# Patient Record
Sex: FEMALE | Race: WHITE | NOT HISPANIC OR LATINO | Employment: FULL TIME | URBAN - METROPOLITAN AREA
[De-identification: names, ages, dates, MRNs, and addresses within clinical notes are randomized per-mention and may not be internally consistent; named-entity substitution may affect disease eponyms.]

---

## 2017-01-09 RX ORDER — LEVOTHYROXINE SODIUM 0.05 MG/1
TABLET ORAL
Qty: 30 TAB | Refills: 0 | Status: SHIPPED | OUTPATIENT
Start: 2017-01-09 | End: 2017-01-16 | Stop reason: SDUPTHER

## 2017-01-09 NOTE — TELEPHONE ENCOUNTER
1 month refilled. Please advise pt to do labs (ordered October 2016)   Rukhsana Francis MD  [facility] [address] [place] [address]

## 2017-01-11 ENCOUNTER — HOSPITAL ENCOUNTER (OUTPATIENT)
Dept: LAB | Facility: MEDICAL CENTER | Age: 62
End: 2017-01-11
Attending: FAMILY MEDICINE
Payer: COMMERCIAL

## 2017-01-11 DIAGNOSIS — E03.9 HYPOTHYROIDISM, UNSPECIFIED TYPE: ICD-10-CM

## 2017-01-11 LAB
T4 FREE SERPL-MCNC: 1.03 NG/DL (ref 0.53–1.43)
TSH SERPL DL<=0.005 MIU/L-ACNC: 3.97 UIU/ML (ref 0.3–3.7)

## 2017-01-11 PROCEDURE — 84443 ASSAY THYROID STIM HORMONE: CPT

## 2017-01-11 PROCEDURE — 84439 ASSAY OF FREE THYROXINE: CPT

## 2017-01-11 PROCEDURE — 36415 COLL VENOUS BLD VENIPUNCTURE: CPT

## 2017-01-16 ENCOUNTER — PATIENT MESSAGE (OUTPATIENT)
Dept: MEDICAL GROUP | Age: 62
End: 2017-01-16

## 2017-01-16 DIAGNOSIS — E03.4 HYPOTHYROIDISM DUE TO ACQUIRED ATROPHY OF THYROID: ICD-10-CM

## 2017-01-16 RX ORDER — LEVOTHYROXINE SODIUM 0.05 MG/1
50 TABLET ORAL EVERY MORNING
Qty: 90 TAB | Refills: 2 | Status: SHIPPED | OUTPATIENT
Start: 2017-01-16 | End: 2017-12-06 | Stop reason: SDUPTHER

## 2017-03-18 DIAGNOSIS — M81.0 OSTEOPOROSIS: ICD-10-CM

## 2017-03-20 RX ORDER — ALENDRONATE SODIUM 70 MG/1
TABLET ORAL
Qty: 12 TAB | Refills: 3 | Status: SHIPPED | OUTPATIENT
Start: 2017-03-20 | End: 2018-02-16 | Stop reason: SDUPTHER

## 2017-03-20 RX ORDER — ATORVASTATIN CALCIUM 40 MG/1
TABLET, FILM COATED ORAL
Qty: 90 TAB | Refills: 1 | Status: SHIPPED | OUTPATIENT
Start: 2017-03-20 | End: 2017-08-07 | Stop reason: SDUPTHER

## 2017-05-11 ENCOUNTER — OFFICE VISIT (OUTPATIENT)
Dept: MEDICAL GROUP | Facility: PHYSICIAN GROUP | Age: 62
End: 2017-05-11
Payer: COMMERCIAL

## 2017-05-11 VITALS
WEIGHT: 174 LBS | RESPIRATION RATE: 16 BRPM | HEIGHT: 70 IN | OXYGEN SATURATION: 95 % | SYSTOLIC BLOOD PRESSURE: 108 MMHG | TEMPERATURE: 97.9 F | HEART RATE: 71 BPM | DIASTOLIC BLOOD PRESSURE: 62 MMHG | BODY MASS INDEX: 24.91 KG/M2

## 2017-05-11 DIAGNOSIS — M81.0 OSTEOPOROSIS, UNSPECIFIED OSTEOPOROSIS TYPE, UNSPECIFIED PATHOLOGICAL FRACTURE PRESENCE: ICD-10-CM

## 2017-05-11 DIAGNOSIS — Z23 NEED FOR ZOSTER VACCINATION: ICD-10-CM

## 2017-05-11 DIAGNOSIS — E03.4 HYPOTHYROIDISM DUE TO ACQUIRED ATROPHY OF THYROID: ICD-10-CM

## 2017-05-11 DIAGNOSIS — Z23 NEED FOR TDAP VACCINATION: ICD-10-CM

## 2017-05-11 DIAGNOSIS — E78.2 MIXED HYPERLIPIDEMIA: ICD-10-CM

## 2017-05-11 PROCEDURE — 90471 IMMUNIZATION ADMIN: CPT | Performed by: FAMILY MEDICINE

## 2017-05-11 PROCEDURE — 99214 OFFICE O/P EST MOD 30 MIN: CPT | Mod: 25 | Performed by: FAMILY MEDICINE

## 2017-05-11 PROCEDURE — 90715 TDAP VACCINE 7 YRS/> IM: CPT | Performed by: FAMILY MEDICINE

## 2017-05-11 ASSESSMENT — ENCOUNTER SYMPTOMS
HEADACHES: 0
CHILLS: 0
COUGH: 0
MUSCULOSKELETAL NEGATIVE: 1
PSYCHIATRIC NEGATIVE: 1
EYES NEGATIVE: 1
DIZZINESS: 0
NEUROLOGICAL NEGATIVE: 1
HEMOPTYSIS: 0
PALPITATIONS: 0
FEVER: 0
CONSTITUTIONAL NEGATIVE: 1
GASTROINTESTINAL NEGATIVE: 1
MYALGIAS: 0
NECK PAIN: 0
CONSTIPATION: 0
RESPIRATORY NEGATIVE: 1
CARDIOVASCULAR NEGATIVE: 1

## 2017-05-11 NOTE — MR AVS SNAPSHOT
"        Shellie Dobbins   2017 4:00 PM   Office Visit   MRN: [id number]    Department:  [facility]    Dept Phone:  [phone]    Description:  Female : 1955   Provider:  Faisal Patel M.D.           Reason for Visit     Establish Care           Allergies as of 2017     Allergen Noted Reactions    Nkda [No Known Drug Allergy] 2011         You were diagnosed with     Mixed hyperlipidemia   [272.2.ICD-9-CM]       Osteoporosis, unspecified osteoporosis type, unspecified pathological fracture presence   [2236900]       Hypothyroidism due to acquired atrophy of thyroid   [6827229]       Need for zoster vaccination   [827463]       Need for Tdap vaccination   [578938]         Vital Signs     Blood Pressure Pulse Temperature Respirations Height Weight    108/62 mmHg 71 36.6 °C (97.9 °F) 16 1.778 m (5' 10\") 78.926 kg (174 lb)    Body Mass Index Oxygen Saturation Last Menstrual Period Smoking Status          24.97 kg/m2 95% 11/10/2006 Never Smoker         Basic Information     Date Of Birth Sex Race Ethnicity Preferred Language    1955 Female White Non- English      Problem List              ICD-10-CM Priority Class Noted - Resolved    Familial hyperlipidemia E78.4   2010 - Present    Vitamin D insufficiency E55.9   2010 - Present    Atopic dermatitis L20.9   2010 - Present    Preventative health care Z00.00   2011 - Present    Osteoporosis M81.0   2011 - Present    Soft tissue mass M79.9   2013 - Present    Menopause Z78.0   2013 - Present    CLAIR (obstructive sleep apnea) G47.33   2013 - Present    H/O tear of meniscus of knee joint Z87.828   3/12/2014 - Present    DDD (degenerative disc disease), cervical M50.30   3/12/2014 - Present    Posterior neck pain M54.2   2016 - Present    Lipoma of neck D17.0   2016 - Present      Health Maintenance        Date Due Completion Dates    IMM DTaP/Tdap/Td Vaccine (2 - Td) 2016 " 7/21/2006    IMM ZOSTER VACCINE 11/10/2016 ---    MAMMOGRAM 12/7/2017 12/7/2016, 10/8/2015, 3/25/2014, 2/8/2013 (Done), 1/18/2012, 1/3/2011, 8/10/2009, 8/10/2009, 8/4/2008, 8/4/2008    Override on 2/8/2013: Done (Carrie Diatnostic)    COLONOSCOPY 12/21/2017 12/21/2007    BONE DENSITY 4/11/2018 4/11/2016, 6/13/2011, 8/4/2008    PAP SMEAR 9/14/2018 9/14/2015, 9/4/2013, 6/7/2011            Current Immunizations     Tdap Vaccine  Incomplete, 7/21/2006      Below and/or attached are the medications your provider expects you to take. Review all of your home medications and newly ordered medications with your provider and/or pharmacist. Follow medication instructions as directed by your provider and/or pharmacist. Please keep your medication list with you and share with your provider. Update the information when medications are discontinued, doses are changed, or new medications (including over-the-counter products) are added; and carry medication information at all times in the event of emergency situations     Allergies:  NKDA - (reactions not documented)               Medications  Valid as of: May 11, 2017 -  4:30 PM    Generic Name Brand Name Tablet Size Instructions for use    Alendronate Sodium (Tab) FOSAMAX 70 MG TAKE 1 TABLET BY MOUTH EVERY 7 DAYS ON AN EMPTY STOMACH WITH A GLASS OF WATER. SIT UPRIGHT AND DO NOT CONSUME FOOD OR MEDS FOR 30 MINUTES        Atorvastatin Calcium (Tab) LIPITOR 40 MG TAKE 1 TABLET BY MOUTH EVERY NIGHT AT BEDTIME        Calcium Carbonate   Take  by mouth every day.        Cholecalciferol (Cap) Vitamin D3 2000 UNIT Take  by mouth every day.        Ibuprofen (Tab) MOTRIN 200 MG Take 600 mg by mouth every 8 hours as needed. Indications: Mild to Moderate Pain        Levothyroxine Sodium (Tab) SYNTHROID 50 MCG Take 1 Tab by mouth every morning. ON EMPTY STOMACH        Multiple Vitamins-Minerals   Take  by mouth every day.        Potassium Gluconate (Cap) Potassium Gluconate 595 MG Take  by mouth  every day.        Triamcinolone Acetonide (Ointment) KENALOG 0.1 % Thin layer to clean dry skin twice daily as needed for rash.        Zoster Vaccine Live (Recon Soln) ZOSTAVAX 84195 UNT/0.65ML Inject 0.65 mL as instructed Once for 1 dose.        .                 Medicines prescribed today were sent to:     [facility] [id number] [place] [address], [place] - [address] [facility]    [address] [facility] [place] [address]    Phone: [phone] Fax: [phone]    Open 24 Hours?: No      Medication refill instructions:       If your prescription bottle indicates you have medication refills left, it is not necessary to call your provider’s office. Please contact your pharmacy and they will refill your medication.    If your prescription bottle indicates you do not have any refills left, you may request refills at any time through one of the following ways: The online ixigo system (except Urgent Care), by calling your provider’s office, or by asking your pharmacy to contact your provider’s office with a refill request. Medication refills are processed only during regular business hours and may not be available until the next business day. Your provider may request additional information or to have a follow-up visit with you prior to refilling your medication.   *Please Note: Medication refills are assigned a new Rx number when refilled electronically. Your pharmacy may indicate that no refills were authorized even though a new prescription for the same medication is available at the pharmacy. Please request the medicine by name with the pharmacy before contacting your provider for a refill.           ixigo Access Code: Activation code not generated  Current ixigo Status: Active

## 2017-08-07 DIAGNOSIS — E78.49 FAMILIAL HYPERLIPIDEMIA: ICD-10-CM

## 2017-08-07 RX ORDER — ATORVASTATIN CALCIUM 40 MG/1
TABLET, FILM COATED ORAL
Qty: 90 TAB | Refills: 1 | Status: SHIPPED | OUTPATIENT
Start: 2017-08-07 | End: 2018-01-31 | Stop reason: SDUPTHER

## 2017-11-27 ENCOUNTER — HOSPITAL ENCOUNTER (OUTPATIENT)
Dept: LAB | Facility: MEDICAL CENTER | Age: 62
End: 2017-11-27
Attending: FAMILY MEDICINE
Payer: COMMERCIAL

## 2017-11-27 DIAGNOSIS — E78.49 FAMILIAL HYPERLIPIDEMIA: ICD-10-CM

## 2017-11-27 LAB
ALBUMIN SERPL BCP-MCNC: 3.7 G/DL (ref 3.2–4.9)
ALBUMIN/GLOB SERPL: 1 G/DL
ALP SERPL-CCNC: 54 U/L (ref 30–99)
ALT SERPL-CCNC: 24 U/L (ref 2–50)
ANION GAP SERPL CALC-SCNC: 6 MMOL/L (ref 0–11.9)
AST SERPL-CCNC: 20 U/L (ref 12–45)
BILIRUB SERPL-MCNC: 0.5 MG/DL (ref 0.1–1.5)
BUN SERPL-MCNC: 20 MG/DL (ref 8–22)
CALCIUM SERPL-MCNC: 9.4 MG/DL (ref 8.5–10.5)
CHLORIDE SERPL-SCNC: 106 MMOL/L (ref 96–112)
CHOLEST SERPL-MCNC: 218 MG/DL (ref 100–199)
CO2 SERPL-SCNC: 28 MMOL/L (ref 20–33)
CREAT SERPL-MCNC: 0.69 MG/DL (ref 0.5–1.4)
ERYTHROCYTE [DISTWIDTH] IN BLOOD BY AUTOMATED COUNT: 47.7 FL (ref 35.9–50)
GFR SERPL CREATININE-BSD FRML MDRD: >60 ML/MIN/1.73 M 2
GLOBULIN SER CALC-MCNC: 3.6 G/DL (ref 1.9–3.5)
GLUCOSE SERPL-MCNC: 117 MG/DL (ref 65–99)
HCT VFR BLD AUTO: 44 % (ref 37–47)
HDLC SERPL-MCNC: 75 MG/DL
HGB BLD-MCNC: 14.5 G/DL (ref 12–16)
LDLC SERPL CALC-MCNC: 133 MG/DL
MCH RBC QN AUTO: 30.7 PG (ref 27–33)
MCHC RBC AUTO-ENTMCNC: 33 G/DL (ref 33.6–35)
MCV RBC AUTO: 93.2 FL (ref 81.4–97.8)
PLATELET # BLD AUTO: 203 K/UL (ref 164–446)
PMV BLD AUTO: 11.8 FL (ref 9–12.9)
POTASSIUM SERPL-SCNC: 4.1 MMOL/L (ref 3.6–5.5)
PROT SERPL-MCNC: 7.3 G/DL (ref 6–8.2)
RBC # BLD AUTO: 4.72 M/UL (ref 4.2–5.4)
SODIUM SERPL-SCNC: 140 MMOL/L (ref 135–145)
TRIGL SERPL-MCNC: 50 MG/DL (ref 0–149)
WBC # BLD AUTO: 4.8 K/UL (ref 4.8–10.8)

## 2017-11-27 PROCEDURE — 84480 ASSAY TRIIODOTHYRONINE (T3): CPT

## 2017-11-27 PROCEDURE — 36415 COLL VENOUS BLD VENIPUNCTURE: CPT

## 2017-11-27 PROCEDURE — 80061 LIPID PANEL: CPT

## 2017-11-27 PROCEDURE — 84439 ASSAY OF FREE THYROXINE: CPT

## 2017-11-27 PROCEDURE — 82306 VITAMIN D 25 HYDROXY: CPT

## 2017-11-27 PROCEDURE — 85027 COMPLETE CBC AUTOMATED: CPT

## 2017-11-27 PROCEDURE — 84443 ASSAY THYROID STIM HORMONE: CPT

## 2017-11-27 PROCEDURE — 80053 COMPREHEN METABOLIC PANEL: CPT

## 2017-11-28 LAB
25(OH)D3 SERPL-MCNC: 40 NG/ML (ref 30–100)
T3 SERPL-MCNC: 104 NG/DL (ref 60–181)
T4 FREE SERPL-MCNC: 0.94 NG/DL (ref 0.53–1.43)
TSH SERPL DL<=0.005 MIU/L-ACNC: 3.72 UIU/ML (ref 0.3–3.7)

## 2017-11-30 ENCOUNTER — OFFICE VISIT (OUTPATIENT)
Dept: MEDICAL GROUP | Facility: PHYSICIAN GROUP | Age: 62
End: 2017-11-30
Payer: COMMERCIAL

## 2017-11-30 VITALS
SYSTOLIC BLOOD PRESSURE: 102 MMHG | TEMPERATURE: 96.7 F | OXYGEN SATURATION: 95 % | RESPIRATION RATE: 14 BRPM | HEIGHT: 70 IN | BODY MASS INDEX: 25.34 KG/M2 | DIASTOLIC BLOOD PRESSURE: 68 MMHG | HEART RATE: 97 BPM | WEIGHT: 177 LBS

## 2017-11-30 DIAGNOSIS — Z00.00 WELL ADULT EXAM: ICD-10-CM

## 2017-11-30 DIAGNOSIS — E03.4 HYPOTHYROIDISM DUE TO ACQUIRED ATROPHY OF THYROID: ICD-10-CM

## 2017-11-30 DIAGNOSIS — R73.02 GLUCOSE INTOLERANCE (IMPAIRED GLUCOSE TOLERANCE): ICD-10-CM

## 2017-11-30 DIAGNOSIS — E78.49 FAMILIAL HYPERLIPIDEMIA: ICD-10-CM

## 2017-11-30 PROCEDURE — 99214 OFFICE O/P EST MOD 30 MIN: CPT | Performed by: FAMILY MEDICINE

## 2017-11-30 ASSESSMENT — ENCOUNTER SYMPTOMS
CARDIOVASCULAR NEGATIVE: 1
GASTROINTESTINAL NEGATIVE: 1
FEVER: 0
MYALGIAS: 0
PSYCHIATRIC NEGATIVE: 1
CHILLS: 0
COUGH: 0
EYES NEGATIVE: 1
NECK PAIN: 0
PALPITATIONS: 0
DIZZINESS: 0
RESPIRATORY NEGATIVE: 1
HEADACHES: 0
CONSTIPATION: 0
MUSCULOSKELETAL NEGATIVE: 1
NEUROLOGICAL NEGATIVE: 1
HEMOPTYSIS: 0
CONSTITUTIONAL NEGATIVE: 1

## 2017-11-30 ASSESSMENT — PATIENT HEALTH QUESTIONNAIRE - PHQ9: CLINICAL INTERPRETATION OF PHQ2 SCORE: 0

## 2017-11-30 NOTE — PROGRESS NOTES
Subjective:      Shellie Dobbins is a 62 y.o. female who presents with Blood Pressure Problem            1. Well adult exam    - REFERRAL TO GASTROENTEROLOGY    2. Familial hyperlipidemia  Currently treated for HLD, taking meds with no new myalgias or joint pain, watching fats in diet  controlled      3. Hypothyroidism due to acquired atrophy of thyroid  Patient is being treated for hypothyroidism, currently taking meds, no symptoms of fast or slow heartbeat and energy level steady. No new hair loss or skin symptoms. Labs have been checked in past year and are stable on current dose.  controlled      4. Glucose intolerance (impaired glucose tolerance)  fbs of 117, advised on low carb diet will have her return in 6 mo and do a1c    Past Medical History:  2/8/2010: Atopic dermatitis  9/1/2011: Baker's cyst of knee  1999: Breast augmentation      Comment: Saline  3/12/2014: DDD (degenerative disc disease), cervical  2/8/2010: Familial hyperlipidemia  3/12/2014: H/O tear of meniscus of knee joint      Comment: Right knee. Had surgery 2011. Now left knee                pain. Felt great last year, working out.                Overdid it working out. Increased pain to point               very swollen, hard to bend and straighten left                leg starting last week. Has felt like about to                give way when walking today. Has MRI scheduled.               Feels just like the right knee felt before                surgery. No brace. Not helping due to swelling.               Taking ibuprofen. 600 mg bid.   No date: High cholesterol  9/1/2011: Medial meniscus tear  9/14/2013: Menopause      Comment: 2007  19yo: Mononucleosis      Comment: Mono induced Hepatitis  9/14/2013: CLAIR (obstructive sleep apnea)      Comment: AHI 17  In 2009. Uses nightgard  6/18/2011: Osteopenia  1/4/2013: Soft tissue mass  2/8/2010: Vitamin d deficiency  Past Surgical History:  9/21/2016: MASS EXCISION ORTHO Bilateral      Comment:  Procedure: MASS EXCISION ORTHO - EXCISION                POSTERIOR NECK LIPOMA;  Surgeon: Troy Kapoor M.D.;  Location: SURGERY [facility]                ORS;  Service:   may 2014: MENISCECTOMY Left      Comment: Karlos - left knee  10/6/2011: KNEE ARTHROSCOPY      Comment: Performed by ALEXANDRIA BRAR at SURGERY                [facility]  10/6/2011: MEDIAL MENISCECTOMY      Comment: Performed by ALEXANDRIA BRAR at SURGERY                [facility]  2007: COLONOSCOPY  1999: MAMMOPLASTY AUGMENTATION      Comment: saline  1997: TUBAL COAGULATION LAPAROSCOPIC BILATERAL  1984: PRIMARY C SECTION  Smoking status: Never Smoker                                                              Smokeless tobacco: Never Used                      Alcohol use: Yes           0.5 oz/week     Glasses of wine: 1 per week     Comment: 1-2 per week    Review of patient's family history indicates:    Stroke                         Mother                      Comment: d 86    Heart Disease                  Father                      Comment: d. 59, rheum heart dz    Hyperlipidemia                 Father                    Cancer                         Sister                      Comment: Melanoma    Diabetes                       Neg Hx                    Heart Disease                  Sister                      Comment: aneurysm on heart      Current Outpatient Prescriptions: •  atorvastatin (LIPITOR) 40 MG Tab, TAKE 1 TABLET BY MOUTH EVERY NIGHT AT BEDTIME, Disp: 90 Tab, Rfl: 1•  alendronate (FOSAMAX) 70 MG Tab, TAKE 1 TABLET BY MOUTH EVERY 7 DAYS ON AN EMPTY STOMACH WITH A GLASS OF WATER. SIT UPRIGHT AND DO NOT CONSUME FOOD OR MEDS FOR 30 MINUTES, Disp: 12 Tab, Rfl: 3•  levothyroxine (SYNTHROID) 50 MCG Tab, Take 1 Tab by mouth every morning. ON EMPTY STOMACH, Disp: 90 Tab, Rfl: 2•  Multiple Vitamins-Minerals (MULTIVITAMIN PO), Take  by mouth every day., Disp: , Rfl: •  Potassium  Gluconate 595 MG Cap, Take  by mouth every day., Disp: , Rfl: •  Calcium Carbonate (CALCIUM 600 PO), Take  by mouth every day., Disp: , Rfl: •  ibuprofen (MOTRIN) 200 MG TABS, Take 600 mg by mouth every 8 hours as needed. Indications: Mild to Moderate Pain, Disp: , Rfl: •  Cholecalciferol (VITAMIN D3) 2000 UNIT Cap, Take  by mouth every day., Disp: , Rfl: •  triamcinolone acetonide (KENALOG) 0.1 % Ointment, Thin layer to clean dry skin twice daily as needed for rash., Disp: 60 g, Rfl: 4    Patient was instructed on the use of medications, either prescriptions or OTC and informed on when the appropriate follow up time period should be. In addition, patient was also instructed that should any acute worsening occur that they should notify this clinic asap or call 911.            Review of Systems   Constitutional: Negative.  Negative for chills and fever.        Past Medical History:  2/8/2010: Atopic dermatitis  9/1/2011: Baker's cyst of knee  1999: Breast augmentation      Comment: Saline  3/12/2014: DDD (degenerative disc disease), cervical  2/8/2010: Familial hyperlipidemia  3/12/2014: H/O tear of meniscus of knee joint      Comment: Right knee. Had surgery 2011. Now left knee                pain. Felt great last year, working out.                Overdid it working out. Increased pain to point               very swollen, hard to bend and straighten left                leg starting last week. Has felt like about to                give way when walking today. Has MRI scheduled.               Feels just like the right knee felt before                surgery. No brace. Not helping due to swelling.               Taking ibuprofen. 600 mg bid.   No date: High cholesterol  9/1/2011: Medial meniscus tear  9/14/2013: Menopause      Comment: 2007  17yo: Mononucleosis      Comment: Mono induced Hepatitis  9/14/2013: CLAIR (obstructive sleep apnea)      Comment: AHI 17  In 2009. Uses nightgard  6/18/2011: Osteopenia  1/4/2013:  Soft tissue mass  2/8/2010: Vitamin d deficiency  Past Surgical History:  9/21/2016: MASS EXCISION ORTHO Bilateral      Comment: Procedure: MASS EXCISION ORTHO - EXCISION                POSTERIOR NECK LIPOMA;  Surgeon: Troy Kapoor M.D.;  Location: SURGERY [facility];  Service:   may 2014: MENISCECTOMY Left      Comment: Karlos - left knee  10/6/2011: KNEE ARTHROSCOPY      Comment: Performed by ALEXANDRIA BRAR at SURGERY                [facility]  10/6/2011: MEDIAL MENISCECTOMY      Comment: Performed by ALEXANDRIA BRAR at SURGERY                [facility]  2007: COLONOSCOPY  1999: MAMMOPLASTY AUGMENTATION      Comment: saline  1997: TUBAL COAGULATION LAPAROSCOPIC BILATERAL  1984: PRIMARY C SECTION  Smoking status: Never Smoker                                                              Smokeless tobacco: Never Used                      Alcohol use: Yes           0.5 oz/week     Glasses of wine: 1 per week     Comment: 1-2 per week    Review of patient's family history indicates:    Stroke                         Mother                      Comment: d 86    Heart Disease                  Father                      Comment: d. 59, rheum heart dz    Hyperlipidemia                 Father                    Cancer                         Sister                      Comment: Melanoma    Diabetes                       Neg Hx                    Heart Disease                  Sister                      Comment: aneurysm on heart     HENT: Negative.    Eyes: Negative.    Respiratory: Negative.  Negative for cough and hemoptysis.    Cardiovascular: Negative.  Negative for chest pain and palpitations.   Gastrointestinal: Negative.  Negative for constipation.   Genitourinary: Negative.  Negative for dysuria and urgency.   Musculoskeletal: Negative.  Negative for myalgias and neck pain.   Skin: Negative.  Negative for rash.   Neurological: Negative.   "Negative for dizziness and headaches.   Endo/Heme/Allergies: Negative.    Psychiatric/Behavioral: Negative.  Negative for suicidal ideas.          Objective:     /68   Pulse 97   Temp 35.9 °C (96.7 °F)   Resp 14   Ht 1.778 m (5' 10\")   Wt 80.3 kg (177 lb)   LMP 05/01/2008   SpO2 95%   BMI 25.40 kg/m²      Physical Exam   Constitutional: She is oriented to person, place, and time. She appears well-developed and well-nourished. No distress.   HENT:   Head: Normocephalic and atraumatic.   Mouth/Throat: Oropharynx is clear and moist. No oropharyngeal exudate.   Eyes: Pupils are equal, round, and reactive to light.   Cardiovascular: Normal rate, regular rhythm, normal heart sounds and intact distal pulses.  Exam reveals no gallop and no friction rub.    No murmur heard.  Pulmonary/Chest: Effort normal and breath sounds normal. No respiratory distress. She has no wheezes. She has no rales. She exhibits no tenderness.   Neurological: She is alert and oriented to person, place, and time.   Skin: She is not diaphoretic.   Psychiatric: She has a normal mood and affect. Her behavior is normal. Judgment and thought content normal.   Nursing note and vitals reviewed.              Assessment/Plan:     1. Well adult exam    - REFERRAL TO GASTROENTEROLOGY    2. Familial hyperlipidemia      3. Hypothyroidism due to acquired atrophy of thyroid      4. Glucose intolerance (impaired glucose tolerance)        "

## 2017-12-07 RX ORDER — LEVOTHYROXINE SODIUM 0.05 MG/1
TABLET ORAL
Qty: 90 TAB | Refills: 0 | Status: SHIPPED | OUTPATIENT
Start: 2017-12-07 | End: 2018-03-04 | Stop reason: SDUPTHER

## 2017-12-13 ENCOUNTER — HOSPITAL ENCOUNTER (OUTPATIENT)
Dept: RADIOLOGY | Facility: MEDICAL CENTER | Age: 62
End: 2017-12-13
Attending: FAMILY MEDICINE
Payer: COMMERCIAL

## 2017-12-13 DIAGNOSIS — Z12.31 VISIT FOR SCREENING MAMMOGRAM: ICD-10-CM

## 2017-12-13 PROCEDURE — G0202 SCR MAMMO BI INCL CAD: HCPCS

## 2018-01-31 DIAGNOSIS — E78.49 FAMILIAL HYPERLIPIDEMIA: ICD-10-CM

## 2018-01-31 RX ORDER — ATORVASTATIN CALCIUM 40 MG/1
TABLET, FILM COATED ORAL
Qty: 90 TAB | Refills: 1 | Status: SHIPPED | OUTPATIENT
Start: 2018-01-31 | End: 2018-06-04 | Stop reason: SDUPTHER

## 2018-02-16 NOTE — TELEPHONE ENCOUNTER
Was the patient seen in the last year in this department? Yes     Does patient have an active prescription for medications requested? Yes     Received Request Via: Pharmacy     Last visit 11/30/2017  Last Labs 11/27/2017

## 2018-02-17 RX ORDER — ALENDRONATE SODIUM 70 MG/1
TABLET ORAL
Qty: 12 TAB | Refills: 0 | Status: SHIPPED | OUTPATIENT
Start: 2018-02-17 | End: 2018-05-20 | Stop reason: SDUPTHER

## 2018-02-20 ENCOUNTER — OFFICE VISIT (OUTPATIENT)
Dept: MEDICAL GROUP | Facility: PHYSICIAN GROUP | Age: 63
End: 2018-02-20
Payer: COMMERCIAL

## 2018-02-20 VITALS
RESPIRATION RATE: 16 BRPM | BODY MASS INDEX: 25.48 KG/M2 | SYSTOLIC BLOOD PRESSURE: 102 MMHG | TEMPERATURE: 97.9 F | HEART RATE: 64 BPM | OXYGEN SATURATION: 96 % | HEIGHT: 70 IN | WEIGHT: 178 LBS | DIASTOLIC BLOOD PRESSURE: 74 MMHG

## 2018-02-20 DIAGNOSIS — L73.9 FOLLICULITIS: ICD-10-CM

## 2018-02-20 PROCEDURE — 99214 OFFICE O/P EST MOD 30 MIN: CPT | Performed by: FAMILY MEDICINE

## 2018-02-20 RX ORDER — SULFAMETHOXAZOLE AND TRIMETHOPRIM 800; 160 MG/1; MG/1
1 TABLET ORAL 2 TIMES DAILY
Qty: 20 TAB | Refills: 0 | Status: SHIPPED | OUTPATIENT
Start: 2018-02-20 | End: 2022-02-01

## 2018-02-20 ASSESSMENT — ENCOUNTER SYMPTOMS
CHILLS: 0
MYALGIAS: 0
HEMOPTYSIS: 0
ANOREXIA: 0
NEUROLOGICAL NEGATIVE: 1
PALPITATIONS: 0
MUSCULOSKELETAL NEGATIVE: 1
RESPIRATORY NEGATIVE: 1
CARDIOVASCULAR NEGATIVE: 1
DIZZINESS: 0
PSYCHIATRIC NEGATIVE: 1
HEADACHES: 0
NECK PAIN: 0
CONSTIPATION: 0
EYES NEGATIVE: 1
GASTROINTESTINAL NEGATIVE: 1
COUGH: 0
FEVER: 0
CONSTITUTIONAL NEGATIVE: 1

## 2018-02-20 NOTE — PROGRESS NOTES
Subjective:      Shellie Dobbins is a 62 y.o. female who presents with Cyst (Right side in vagina x 1 week)            Developed a bump in the groin area, squeezed it and it gave a discharge and bump still present    There are no diagnoses linked to this encounter.  Past Medical History:  2/8/2010: Atopic dermatitis  9/1/2011: Baker's cyst of knee  1999: Breast augmentation      Comment: Saline  3/12/2014: DDD (degenerative disc disease), cervical  2/8/2010: Familial hyperlipidemia  3/12/2014: H/O tear of meniscus of knee joint      Comment: Right knee. Had surgery 2011. Now left knee                pain. Felt great last year, working out.                Overdid it working out. Increased pain to point               very swollen, hard to bend and straighten left                leg starting last week. Has felt like about to                give way when walking today. Has MRI scheduled.               Feels just like the right knee felt before                surgery. No brace. Not helping due to swelling.               Taking ibuprofen. 600 mg bid.   No date: High cholesterol  9/1/2011: Medial meniscus tear  9/14/2013: Menopause      Comment: 2007  17yo: Mononucleosis      Comment: Mono induced Hepatitis  9/14/2013: CLAIR (obstructive sleep apnea)      Comment: AHI 17  In 2009. Uses nightgard  6/18/2011: Osteopenia  1/4/2013: Soft tissue mass  2/8/2010: Vitamin d deficiency  Past Surgical History:  9/21/2016: MASS EXCISION ORTHO Bilateral      Comment: Procedure: MASS EXCISION ORTHO - EXCISION                POSTERIOR NECK LIPOMA;  Surgeon: Troy Kapoor M.D.;  Location: SURGERY [facility];  Service:   may 2014: MENISCECTOMY Left      Comment: Karlos - left knee  10/6/2011: KNEE ARTHROSCOPY      Comment: Performed by ALEXANDRIA BRAR at SURGERY                [facility]  10/6/2011: MEDIAL MENISCECTOMY      Comment: Performed by ALEXANDRIA BRAR at SURGERY             [facility]  2007: COLONOSCOPY  1999: MAMMOPLASTY AUGMENTATION      Comment: saline  1997: TUBAL COAGULATION LAPAROSCOPIC BILATERAL  1984: PRIMARY C SECTION  Smoking status: Never Smoker                                                              Smokeless tobacco: Never Used                      Alcohol use: Yes           0.5 oz/week     Glasses of wine: 1 per week     Comment: 1-2 per week    Review of patient's family history indicates:    Stroke                         Mother                      Comment: d 86    Heart Disease                  Father                      Comment: d. 59, rheum heart dz    Hyperlipidemia                 Father                    Cancer                         Sister                      Comment: Melanoma    Diabetes                       Neg Hx                    Heart Disease                  Sister                      Comment: aneurysm on heart      Current Outpatient Prescriptions: •  alendronate (FOSAMAX) 70 MG Tab, TAKE 1 TABLET BY MOUTH EVERY 7 DAYS ON AN EMPTY STOMACH WITH A GLASS OF WATER. SIT UPRIGHT AND DO NOT CONSUME FOOD OR MEDS FOR 30 MINUTES, Disp: 12 Tab, Rfl: 0•  atorvastatin (LIPITOR) 40 MG Tab, TAKE 1 TABLET BY MOUTH EVERY NIGHT AT BEDTIME, Disp: 90 Tab, Rfl: 1•  levothyroxine (SYNTHROID) 50 MCG Tab, TAKE 1 TABLET BY MOUTH EVERY MORNING ON AN EMPTY STOMACH, Disp: 90 Tab, Rfl: 0•  Multiple Vitamins-Minerals (MULTIVITAMIN PO), Take  by mouth every day., Disp: , Rfl: •  Cholecalciferol (VITAMIN D3) 2000 UNIT Cap, Take  by mouth every day., Disp: , Rfl: •  Calcium Carbonate (CALCIUM 600 PO), Take  by mouth every day., Disp: , Rfl: •  triamcinolone acetonide (KENALOG) 0.1 % Ointment, Thin layer to clean dry skin twice daily as needed for rash., Disp: 60 g, Rfl: 4•  ibuprofen (MOTRIN) 200 MG TABS, Take 600 mg by mouth every 8 hours as needed. Indications: Mild to Moderate Pain, Disp: , Rfl: •  Potassium Gluconate 595 MG Cap, Take  by mouth every day.,  Disp: , Rfl:     Patient was instructed on the use of medications, either prescriptions or OTC and informed on when the appropriate follow up time period should be. In addition, patient was also instructed that should any acute worsening occur that they should notify this clinic asap or call 911.          Rash   This is a new problem. The current episode started in the past 7 days. The problem is unchanged. The affected locations include the groin. The rash is characterized by redness and swelling. She was exposed to nothing. Pertinent negatives include no anorexia, congestion, cough or fever. Past treatments include nothing. The treatment provided no relief.       Review of Systems   Constitutional: Negative.  Negative for chills and fever.        Past Medical History:  2/8/2010: Atopic dermatitis  9/1/2011: Baker's cyst of knee  1999: Breast augmentation      Comment: Saline  3/12/2014: DDD (degenerative disc disease), cervical  2/8/2010: Familial hyperlipidemia  3/12/2014: H/O tear of meniscus of knee joint      Comment: Right knee. Had surgery 2011. Now left knee                pain. Felt great last year, working out.                Overdid it working out. Increased pain to point               very swollen, hard to bend and straighten left                leg starting last week. Has felt like about to                give way when walking today. Has MRI scheduled.               Feels just like the right knee felt before                surgery. No brace. Not helping due to swelling.               Taking ibuprofen. 600 mg bid.   No date: High cholesterol  9/1/2011: Medial meniscus tear  9/14/2013: Menopause      Comment: 2007  17yo: Mononucleosis      Comment: Mono induced Hepatitis  9/14/2013: CLAIR (obstructive sleep apnea)      Comment: AHI 17  In 2009. Uses nightgard  6/18/2011: Osteopenia  1/4/2013: Soft tissue mass  2/8/2010: Vitamin d deficiency  Past Surgical History:  9/21/2016: MASS EXCISION ORTHO  Bilateral      Comment: Procedure: MASS EXCISION ORTHO - EXCISION                POSTERIOR NECK LIPOMA;  Surgeon: Troy Kapoor M.D.;  Location: SURGERY [facility];  Service:   may 2014: MENISCECTOMY Left      Comment: Karlos - left knee  10/6/2011: KNEE ARTHROSCOPY      Comment: Performed by ALEXANDRIA BRAR at SURGERY                [facility]  10/6/2011: MEDIAL MENISCECTOMY      Comment: Performed by ALEXANDRIA BRAR at SURGERY                [facility]  2007: COLONOSCOPY  1999: MAMMOPLASTY AUGMENTATION      Comment: saline  1997: TUBAL COAGULATION LAPAROSCOPIC BILATERAL  1984: PRIMARY C SECTION  Smoking status: Never Smoker                                                              Smokeless tobacco: Never Used                      Alcohol use: Yes           0.5 oz/week     Glasses of wine: 1 per week     Comment: 1-2 per week    Review of patient's family history indicates:    Stroke                         Mother                      Comment: d 86    Heart Disease                  Father                      Comment: d. 59, rheum heart dz    Hyperlipidemia                 Father                    Cancer                         Sister                      Comment: Melanoma    Diabetes                       Neg Hx                    Heart Disease                  Sister                      Comment: aneurysm on heart     HENT: Negative.  Negative for congestion.    Eyes: Negative.    Respiratory: Negative.  Negative for cough and hemoptysis.    Cardiovascular: Negative.  Negative for chest pain and palpitations.   Gastrointestinal: Negative.  Negative for anorexia and constipation.   Genitourinary: Negative.  Negative for dysuria and urgency.   Musculoskeletal: Negative.  Negative for myalgias and neck pain.   Skin: Positive for rash.   Neurological: Negative.  Negative for dizziness and headaches.   Endo/Heme/Allergies: Negative.   "  Psychiatric/Behavioral: Negative.  Negative for suicidal ideas.          Objective:     /74   Pulse 64   Temp 36.6 °C (97.9 °F)   Resp 16   Ht 1.778 m (5' 10\")   Wt 80.7 kg (178 lb)   LMP 05/01/2008   SpO2 96%   BMI 25.54 kg/m²      Physical Exam   Genitourinary:         Genitourinary Comments: Small area of induration on the right lower labial area               Assessment/Plan:     Infected hair follicle - will treat with bactrim to make sure no residual infection present and neosporin prn      "

## 2018-03-04 DIAGNOSIS — M81.8 OTHER OSTEOPOROSIS WITHOUT CURRENT PATHOLOGICAL FRACTURE: ICD-10-CM

## 2018-03-05 RX ORDER — LEVOTHYROXINE SODIUM 0.05 MG/1
TABLET ORAL
Qty: 90 TAB | Refills: 0 | Status: SHIPPED | OUTPATIENT
Start: 2018-03-05 | End: 2018-06-04 | Stop reason: SDUPTHER

## 2018-03-05 NOTE — TELEPHONE ENCOUNTER
Was the patient seen in the last year in this department? Yes     Does patient have an active prescription for medications requested? No     Received Request Via: Pharmacy     Last Visit: 02/20/18  Last Labs: 11/27/17  Next Appt: 05/31/18

## 2018-05-09 ENCOUNTER — HOSPITAL ENCOUNTER (OUTPATIENT)
Dept: RADIOLOGY | Facility: MEDICAL CENTER | Age: 63
End: 2018-05-09
Attending: FAMILY MEDICINE
Payer: COMMERCIAL

## 2018-05-09 PROCEDURE — 77080 DXA BONE DENSITY AXIAL: CPT

## 2018-05-20 DIAGNOSIS — E03.4 HYPOTHYROIDISM DUE TO ACQUIRED ATROPHY OF THYROID: ICD-10-CM

## 2018-05-20 DIAGNOSIS — Z76.0 MEDICATION REFILL: ICD-10-CM

## 2018-05-20 DIAGNOSIS — E78.49 FAMILIAL HYPERLIPIDEMIA: ICD-10-CM

## 2018-05-20 DIAGNOSIS — E55.9 VITAMIN D INSUFFICIENCY: ICD-10-CM

## 2018-05-21 RX ORDER — ALENDRONATE SODIUM 70 MG/1
TABLET ORAL
Qty: 30 TAB | Refills: 0 | Status: SHIPPED | OUTPATIENT
Start: 2018-05-21 | End: 2018-06-04 | Stop reason: SDUPTHER

## 2018-06-04 DIAGNOSIS — Z76.0 MEDICATION REFILL: ICD-10-CM

## 2018-06-04 DIAGNOSIS — E55.9 VITAMIN D INSUFFICIENCY: ICD-10-CM

## 2018-06-04 DIAGNOSIS — M81.8 OTHER OSTEOPOROSIS WITHOUT CURRENT PATHOLOGICAL FRACTURE: ICD-10-CM

## 2018-06-04 DIAGNOSIS — E03.4 HYPOTHYROIDISM DUE TO ACQUIRED ATROPHY OF THYROID: ICD-10-CM

## 2018-06-04 DIAGNOSIS — E78.49 FAMILIAL HYPERLIPIDEMIA: ICD-10-CM

## 2018-06-04 RX ORDER — LEVOTHYROXINE SODIUM 0.05 MG/1
TABLET ORAL
Qty: 90 TAB | Refills: 1 | Status: SHIPPED | OUTPATIENT
Start: 2018-06-04 | End: 2018-12-03 | Stop reason: SDUPTHER

## 2018-06-04 RX ORDER — ATORVASTATIN CALCIUM 40 MG/1
TABLET, FILM COATED ORAL
Qty: 90 TAB | Refills: 1 | Status: SHIPPED | OUTPATIENT
Start: 2018-06-04 | End: 2018-12-03 | Stop reason: SDUPTHER

## 2018-06-04 RX ORDER — ALENDRONATE SODIUM 70 MG/1
TABLET ORAL
Qty: 90 TAB | Refills: 1 | Status: SHIPPED | OUTPATIENT
Start: 2018-06-04 | End: 2018-06-05 | Stop reason: SDUPTHER

## 2018-06-05 DIAGNOSIS — E03.4 HYPOTHYROIDISM DUE TO ACQUIRED ATROPHY OF THYROID: ICD-10-CM

## 2018-06-05 DIAGNOSIS — E55.9 VITAMIN D INSUFFICIENCY: ICD-10-CM

## 2018-06-05 DIAGNOSIS — E78.49 FAMILIAL HYPERLIPIDEMIA: ICD-10-CM

## 2018-06-05 DIAGNOSIS — Z76.0 MEDICATION REFILL: ICD-10-CM

## 2018-06-05 RX ORDER — ALENDRONATE SODIUM 70 MG/1
TABLET ORAL
Qty: 12 TAB | Refills: 1 | Status: SHIPPED | OUTPATIENT
Start: 2018-06-05 | End: 2018-10-22 | Stop reason: SDUPTHER

## 2018-06-05 NOTE — TELEPHONE ENCOUNTER
Was the patient seen in the last year in this department? Yes     Does patient have an active prescription for medications requested? Yes     Received Request Via: Pharmacy     Last visit 2/20/2018   Last labs 11/27/2017

## 2018-06-11 DIAGNOSIS — M81.8 OTHER OSTEOPOROSIS WITHOUT CURRENT PATHOLOGICAL FRACTURE: ICD-10-CM

## 2018-06-12 RX ORDER — LEVOTHYROXINE SODIUM 0.05 MG/1
TABLET ORAL
Qty: 90 TAB | Refills: 0 | Status: SHIPPED | OUTPATIENT
Start: 2018-06-12 | End: 2020-04-07 | Stop reason: SDUPTHER

## 2018-06-12 NOTE — TELEPHONE ENCOUNTER
Was the patient seen in the last year in this department? Yes     Does patient have an active prescription for medications requested? Yes     Received Request Via: Pharmacy     Last Visit: 2/20/18  Last Labs: 11/27/17

## 2018-10-22 DIAGNOSIS — E78.49 FAMILIAL HYPERLIPIDEMIA: ICD-10-CM

## 2018-10-22 DIAGNOSIS — Z76.0 MEDICATION REFILL: ICD-10-CM

## 2018-10-22 DIAGNOSIS — E03.4 HYPOTHYROIDISM DUE TO ACQUIRED ATROPHY OF THYROID: ICD-10-CM

## 2018-10-22 DIAGNOSIS — E55.9 VITAMIN D INSUFFICIENCY: ICD-10-CM

## 2018-10-23 ENCOUNTER — HOSPITAL ENCOUNTER (OUTPATIENT)
Dept: LAB | Facility: MEDICAL CENTER | Age: 63
End: 2018-10-23
Attending: FAMILY MEDICINE
Payer: COMMERCIAL

## 2018-10-23 DIAGNOSIS — E55.9 VITAMIN D INSUFFICIENCY: ICD-10-CM

## 2018-10-23 DIAGNOSIS — E78.49 FAMILIAL HYPERLIPIDEMIA: ICD-10-CM

## 2018-10-23 DIAGNOSIS — E03.4 HYPOTHYROIDISM DUE TO ACQUIRED ATROPHY OF THYROID: ICD-10-CM

## 2018-10-23 DIAGNOSIS — Z76.0 MEDICATION REFILL: ICD-10-CM

## 2018-10-23 LAB
25(OH)D3 SERPL-MCNC: 30 NG/ML (ref 30–100)
BASOPHILS # BLD AUTO: 0.8 % (ref 0–1.8)
BASOPHILS # BLD: 0.03 K/UL (ref 0–0.12)
EOSINOPHIL # BLD AUTO: 0.1 K/UL (ref 0–0.51)
EOSINOPHIL NFR BLD: 2.6 % (ref 0–6.9)
ERYTHROCYTE [DISTWIDTH] IN BLOOD BY AUTOMATED COUNT: 47.1 FL (ref 35.9–50)
HCT VFR BLD AUTO: 43.4 % (ref 37–47)
HGB BLD-MCNC: 14.8 G/DL (ref 12–16)
IMM GRANULOCYTES # BLD AUTO: 0 K/UL (ref 0–0.11)
IMM GRANULOCYTES NFR BLD AUTO: 0 % (ref 0–0.9)
LYMPHOCYTES # BLD AUTO: 1.54 K/UL (ref 1–4.8)
LYMPHOCYTES NFR BLD: 39.5 % (ref 22–41)
MCH RBC QN AUTO: 31.7 PG (ref 27–33)
MCHC RBC AUTO-ENTMCNC: 34.1 G/DL (ref 33.6–35)
MCV RBC AUTO: 92.9 FL (ref 81.4–97.8)
MONOCYTES # BLD AUTO: 0.43 K/UL (ref 0–0.85)
MONOCYTES NFR BLD AUTO: 11 % (ref 0–13.4)
NEUTROPHILS # BLD AUTO: 1.8 K/UL (ref 2–7.15)
NEUTROPHILS NFR BLD: 46.1 % (ref 44–72)
NRBC # BLD AUTO: 0 K/UL
NRBC BLD-RTO: 0 /100 WBC
PLATELET # BLD AUTO: 168 K/UL (ref 164–446)
PMV BLD AUTO: 12.3 FL (ref 9–12.9)
RBC # BLD AUTO: 4.67 M/UL (ref 4.2–5.4)
T3 SERPL-MCNC: 98.2 NG/DL (ref 60–181)
T4 FREE SERPL-MCNC: 0.82 NG/DL (ref 0.53–1.43)
WBC # BLD AUTO: 3.9 K/UL (ref 4.8–10.8)

## 2018-10-23 PROCEDURE — 84439 ASSAY OF FREE THYROXINE: CPT

## 2018-10-23 PROCEDURE — 82306 VITAMIN D 25 HYDROXY: CPT

## 2018-10-23 PROCEDURE — 80053 COMPREHEN METABOLIC PANEL: CPT

## 2018-10-23 PROCEDURE — 80061 LIPID PANEL: CPT

## 2018-10-23 PROCEDURE — 85025 COMPLETE CBC W/AUTO DIFF WBC: CPT

## 2018-10-23 PROCEDURE — 36415 COLL VENOUS BLD VENIPUNCTURE: CPT

## 2018-10-23 PROCEDURE — 84480 ASSAY TRIIODOTHYRONINE (T3): CPT

## 2018-10-23 RX ORDER — ALENDRONATE SODIUM 70 MG/1
TABLET ORAL
Qty: 12 TAB | Refills: 1 | Status: SHIPPED | OUTPATIENT
Start: 2018-10-23 | End: 2018-12-14 | Stop reason: SDUPTHER

## 2018-10-24 LAB
ALBUMIN SERPL BCP-MCNC: 4.4 G/DL (ref 3.2–4.9)
ALBUMIN/GLOB SERPL: 1.6 G/DL
ALP SERPL-CCNC: 47 U/L (ref 30–99)
ALT SERPL-CCNC: 26 U/L (ref 2–50)
ANION GAP SERPL CALC-SCNC: 7 MMOL/L (ref 0–11.9)
AST SERPL-CCNC: 25 U/L (ref 12–45)
BILIRUB SERPL-MCNC: 0.6 MG/DL (ref 0.1–1.5)
BUN SERPL-MCNC: 19 MG/DL (ref 8–22)
CALCIUM SERPL-MCNC: 9.5 MG/DL (ref 8.5–10.5)
CHLORIDE SERPL-SCNC: 106 MMOL/L (ref 96–112)
CHOLEST SERPL-MCNC: 219 MG/DL (ref 100–199)
CO2 SERPL-SCNC: 28 MMOL/L (ref 20–33)
CREAT SERPL-MCNC: 0.81 MG/DL (ref 0.5–1.4)
FASTING STATUS PATIENT QL REPORTED: NORMAL
GLOBULIN SER CALC-MCNC: 2.7 G/DL (ref 1.9–3.5)
GLUCOSE SERPL-MCNC: 103 MG/DL (ref 65–99)
HDLC SERPL-MCNC: 72 MG/DL
LDLC SERPL CALC-MCNC: 135 MG/DL
POTASSIUM SERPL-SCNC: 4.2 MMOL/L (ref 3.6–5.5)
PROT SERPL-MCNC: 7.1 G/DL (ref 6–8.2)
SODIUM SERPL-SCNC: 141 MMOL/L (ref 135–145)
TRIGL SERPL-MCNC: 60 MG/DL (ref 0–149)

## 2018-10-30 ENCOUNTER — TELEPHONE (OUTPATIENT)
Dept: MEDICAL GROUP | Facility: PHYSICIAN GROUP | Age: 63
End: 2018-10-30

## 2018-11-06 ENCOUNTER — OFFICE VISIT (OUTPATIENT)
Dept: MEDICAL GROUP | Facility: PHYSICIAN GROUP | Age: 63
End: 2018-11-06
Payer: COMMERCIAL

## 2018-11-06 VITALS
RESPIRATION RATE: 12 BRPM | HEART RATE: 62 BPM | HEIGHT: 70 IN | TEMPERATURE: 96.3 F | BODY MASS INDEX: 25.05 KG/M2 | OXYGEN SATURATION: 94 % | SYSTOLIC BLOOD PRESSURE: 102 MMHG | WEIGHT: 175 LBS | DIASTOLIC BLOOD PRESSURE: 60 MMHG

## 2018-11-06 DIAGNOSIS — D70.8 OTHER NEUTROPENIA (HCC): ICD-10-CM

## 2018-11-06 DIAGNOSIS — E55.9 VITAMIN D DEFICIENCY: ICD-10-CM

## 2018-11-06 DIAGNOSIS — E74.39 GLUCOSE INTOLERANCE: ICD-10-CM

## 2018-11-06 PROCEDURE — 99214 OFFICE O/P EST MOD 30 MIN: CPT | Performed by: FAMILY MEDICINE

## 2018-11-06 ASSESSMENT — ENCOUNTER SYMPTOMS
DIZZINESS: 0
GASTROINTESTINAL NEGATIVE: 1
CONSTITUTIONAL NEGATIVE: 1
DOUBLE VISION: 0
CARDIOVASCULAR NEGATIVE: 1
MYALGIAS: 0
HEARTBURN: 0
TINGLING: 0
PSYCHIATRIC NEGATIVE: 1
NEUROLOGICAL NEGATIVE: 1
RESPIRATORY NEGATIVE: 1
FEVER: 0
BRUISES/BLEEDS EASILY: 0
DEPRESSION: 0
COUGH: 0
BLURRED VISION: 0
PALPITATIONS: 0
HEMOPTYSIS: 0
NAUSEA: 0
CHILLS: 0
EYES NEGATIVE: 1
MUSCULOSKELETAL NEGATIVE: 1
HEADACHES: 0

## 2018-11-06 ASSESSMENT — PATIENT HEALTH QUESTIONNAIRE - PHQ9: CLINICAL INTERPRETATION OF PHQ2 SCORE: 0

## 2018-11-06 NOTE — PROGRESS NOTES
Subjective:      Shellie Dobbins is a 62 y.o. female who presents with Hyperlipidemia            1. Other neutropenia (HCC)  Slightly low wbc of 3.8  No sx will monitor  - COMP METABOLIC PANEL; Future  - VITAMIN D,25 HYDROXY; Future  - CBC WITH DIFFERENTIAL; Future  - HEMOGLOBIN A1C; Future    2. Vitamin D deficiency  Patient is currently treated for vitamin D deficiency with supplemental vitamin d, increase sunlight and dairy products. No current issues with treatment  controlled    - COMP METABOLIC PANEL; Future  - VITAMIN D,25 HYDROXY; Future  - CBC WITH DIFFERENTIAL; Future  - HEMOGLOBIN A1C; Future    3. Glucose intolerance  Due to the patient's issues with glucose management, today they were extensively counseled on a low carb diet and exercise and losing weight    - COMP METABOLIC PANEL; Future  - VITAMIN D,25 HYDROXY; Future  - CBC WITH DIFFERENTIAL; Future  - HEMOGLOBIN A1C; Future    Past Medical History:  2/8/2010: Atopic dermatitis  9/1/2011: Baker's cyst of knee  1999: Breast augmentation      Comment:  Saline  3/12/2014: DDD (degenerative disc disease), cervical  2/8/2010: Familial hyperlipidemia  3/12/2014: H/O tear of meniscus of knee joint      Comment:  Right knee. Had surgery 2011. Now left knee pain. Felt                great last year, working out. Overdid it working out.                Increased pain to point very swollen, hard to bend and                straighten left leg starting last week. Has felt like                about to give way when walking today. Has MRI scheduled.                Feels just like the right knee felt before surgery. No                brace. Not helping due to swelling. Taking ibuprofen. 600               mg bid.   No date: High cholesterol  9/1/2011: Medial meniscus tear  9/14/2013: Menopause      Comment:  2007  19yo: Mononucleosis      Comment:  Mono induced Hepatitis  9/14/2013: CLAIR (obstructive sleep apnea)      Comment:  AHI 17  In 2009. Uses  nightgard  6/18/2011: Osteopenia  1/4/2013: Soft tissue mass  2/8/2010: Vitamin d deficiency  Past Surgical History:  9/21/2016: MASS EXCISION ORTHO; Bilateral      Comment:  Procedure: MASS EXCISION ORTHO - EXCISION POSTERIOR NECK               LIPOMA;  Surgeon: Troy Kapoor M.D.;  Location:                [facility]ERY [facility];  Service:   may 2014: MENISCECTOMY; Left      Comment:  Karlos Mccall left knee  10/6/2011: KNEE ARTHROSCOPY      Comment:  Performed by ALEXANDRIA BRAR at SURGERY [facility]  10/6/2011: MEDIAL MENISCECTOMY      Comment:  Performed by ALEXANDRIA BRAR at SURGERY [facility]  2007: COLONOSCOPY  1999: MAMMOPLASTY AUGMENTATION      Comment:  saline  1997: TUBAL COAGULATION LAPAROSCOPIC BILATERAL  1984: PRIMARY C SECTION  Smoking status: Never Smoker                                                              Smokeless tobacco: Never Used                      Alcohol use: Yes           0.5 oz/week     Glasses of wine: 1 per week     Comment: 1-2 per week    Review of patient's family history indicates:  Problem: Stroke      Relation: Mother       Age of Onset: 75       Comment: d 86  Problem: Heart Disease      Relation: Father       Age of Onset: (Not Specified)       Comment: d. 59, rheum heart dz  Problem: Hyperlipidemia      Relation: Father       Age of Onset: (Not Specified)   Problem: Cancer      Relation: Sister       Age of Onset: (Not Specified)       Comment: Melanoma  Problem: Diabetes      Relation: Neg Hx       Age of Onset: (Not Specified)   Problem: Heart Disease      Relation: Sister       Age of Onset: (Not Specified)       Comment: aneurysm on heart      Current Outpatient Prescriptions: •  alendronate (FOSAMAX) 70 MG Tab, SEE DIRECTIONS ON  MEDICATION INFORMATION  SHEET WITH INVOICE  PAPERWORK, Disp: 12 Tab, Rfl: 1•  levothyroxine (SYNTHROID) 50 MCG Tab, TAKE 1 TABLET BY MOUTH EVERY MORNING ON AN EMPTY STOMACH,  Disp: 90 Tab, Rfl: 0•  atorvastatin (LIPITOR) 40 MG Tab, TAKE 1 TABLET BY MOUTH EVERY NIGHT AT BEDTIME, Disp: 90 Tab, Rfl: 1•  levothyroxine (SYNTHROID) 50 MCG Tab, TAKE 1 TABLET BY MOUTH ONCE DAILY IN THE MORNING ON AN EMPTY STOMACH, Disp: 90 Tab, Rfl: 1•  sulfamethoxazole-trimethoprim (BACTRIM DS) 800-160 MG tablet, Take 1 Tab by mouth 2 times a day., Disp: 20 Tab, Rfl: 0•  triamcinolone acetonide (KENALOG) 0.1 % Ointment, Thin layer to clean dry skin twice daily as needed for rash., Disp: 60 g, Rfl: 4•  Multiple Vitamins-Minerals (MULTIVITAMIN PO), Take  by mouth every day., Disp: , Rfl: •  Cholecalciferol (VITAMIN D3) 2000 UNIT Cap, Take  by mouth every day., Disp: , Rfl: •  Potassium Gluconate 595 MG Cap, Take  by mouth every day., Disp: , Rfl: •  Calcium Carbonate (CALCIUM 600 PO), Take  by mouth every day., Disp: , Rfl: •  ibuprofen (MOTRIN) 200 MG TABS, Take 600 mg by mouth every 8 hours as needed. Indications: Mild to Moderate Pain, Disp: , Rfl:     Patient was instructed on the use of medications, either prescriptions or OTC and informed on when the appropriate follow up time period should be. In addition, patient was also instructed that should any acute worsening occur that they should notify this clinic asap or call 911.            Review of Systems   Constitutional: Negative.  Negative for chills and fever.   HENT: Negative.  Negative for hearing loss.    Eyes: Negative.  Negative for blurred vision and double vision.   Respiratory: Negative.  Negative for cough and hemoptysis.    Cardiovascular: Negative.  Negative for chest pain and palpitations.   Gastrointestinal: Negative.  Negative for heartburn and nausea.   Genitourinary: Negative.  Negative for dysuria.   Musculoskeletal: Negative.  Negative for myalgias.   Skin: Negative.  Negative for rash.   Neurological: Negative.  Negative for dizziness, tingling and headaches.   Endo/Heme/Allergies: Negative.  Does not bruise/bleed easily.  "  Psychiatric/Behavioral: Negative.  Negative for depression and suicidal ideas.   All other systems reviewed and are negative.         Objective:     /60 (BP Location: Left arm, Patient Position: Sitting)   Pulse 62   Temp (!) 35.7 °C (96.3 °F)   Resp 12   Ht 1.778 m (5' 10\")   Wt 79.4 kg (175 lb)   LMP 05/01/2008   SpO2 94%   BMI 25.11 kg/m²      Physical Exam   Constitutional: She is oriented to person, place, and time. She appears well-developed and well-nourished. No distress.   HENT:   Head: Normocephalic and atraumatic.   Mouth/Throat: Oropharynx is clear and moist. No oropharyngeal exudate.   Eyes: Pupils are equal, round, and reactive to light.   Cardiovascular: Normal rate, regular rhythm, normal heart sounds and intact distal pulses.  Exam reveals no gallop and no friction rub.    No murmur heard.  Pulmonary/Chest: Effort normal and breath sounds normal. No respiratory distress. She has no wheezes. She has no rales. She exhibits no tenderness.   Neurological: She is alert and oriented to person, place, and time.   Skin: She is not diaphoretic.   Psychiatric: She has a normal mood and affect. Her behavior is normal. Judgment and thought content normal.   Nursing note and vitals reviewed.              Assessment/Plan:     1. Other neutropenia (HCC)    - COMP METABOLIC PANEL; Future  - VITAMIN D,25 HYDROXY; Future  - CBC WITH DIFFERENTIAL; Future  - HEMOGLOBIN A1C; Future    2. Vitamin D deficiency    - COMP METABOLIC PANEL; Future  - VITAMIN D,25 HYDROXY; Future  - CBC WITH DIFFERENTIAL; Future  - HEMOGLOBIN A1C; Future    3. Glucose intolerance    - COMP METABOLIC PANEL; Future  - VITAMIN D,25 HYDROXY; Future  - CBC WITH DIFFERENTIAL; Future  - HEMOGLOBIN A1C; Future      "

## 2018-12-03 DIAGNOSIS — E78.49 FAMILIAL HYPERLIPIDEMIA: ICD-10-CM

## 2018-12-03 DIAGNOSIS — M81.8 OTHER OSTEOPOROSIS WITHOUT CURRENT PATHOLOGICAL FRACTURE: ICD-10-CM

## 2018-12-04 RX ORDER — LEVOTHYROXINE SODIUM 0.05 MG/1
TABLET ORAL
Qty: 90 TAB | Refills: 1 | Status: SHIPPED | OUTPATIENT
Start: 2018-12-04 | End: 2019-05-09 | Stop reason: SDUPTHER

## 2018-12-04 RX ORDER — ATORVASTATIN CALCIUM 40 MG/1
TABLET, FILM COATED ORAL
Qty: 90 TAB | Refills: 1 | Status: SHIPPED | OUTPATIENT
Start: 2018-12-04 | End: 2019-05-09 | Stop reason: SDUPTHER

## 2018-12-04 NOTE — TELEPHONE ENCOUNTER
Was the patient seen in the last year in this department? Yes    Does patient have an active prescription for medications requested? Yes    Received Request Via: Pharmacy     Last Visit: 11/6/18  Last Labs: 10/24/18

## 2018-12-14 ENCOUNTER — PATIENT MESSAGE (OUTPATIENT)
Dept: MEDICAL GROUP | Facility: PHYSICIAN GROUP | Age: 63
End: 2018-12-14

## 2018-12-14 DIAGNOSIS — E78.49 FAMILIAL HYPERLIPIDEMIA: ICD-10-CM

## 2018-12-14 DIAGNOSIS — E03.4 HYPOTHYROIDISM DUE TO ACQUIRED ATROPHY OF THYROID: ICD-10-CM

## 2018-12-14 DIAGNOSIS — Z76.0 MEDICATION REFILL: ICD-10-CM

## 2018-12-14 DIAGNOSIS — E55.9 VITAMIN D INSUFFICIENCY: ICD-10-CM

## 2018-12-15 NOTE — PATIENT COMMUNICATION
Was the patient seen in the last year in this department? Yes    Does patient have an active prescription for medications requested? Yes    Received Request Via: Patient        Last visit: 11/6/18  Last labs:10/23/18

## 2018-12-17 RX ORDER — ALENDRONATE SODIUM 70 MG/1
TABLET ORAL
Qty: 12 TAB | Refills: 1 | Status: SHIPPED | OUTPATIENT
Start: 2018-12-17 | End: 2019-05-10 | Stop reason: SDUPTHER

## 2019-02-11 ENCOUNTER — OFFICE VISIT (OUTPATIENT)
Dept: MEDICAL GROUP | Facility: PHYSICIAN GROUP | Age: 64
End: 2019-02-11
Payer: COMMERCIAL

## 2019-02-11 VITALS
OXYGEN SATURATION: 93 % | DIASTOLIC BLOOD PRESSURE: 70 MMHG | TEMPERATURE: 97.5 F | BODY MASS INDEX: 25.77 KG/M2 | SYSTOLIC BLOOD PRESSURE: 110 MMHG | WEIGHT: 180 LBS | RESPIRATION RATE: 12 BRPM | HEIGHT: 70 IN | HEART RATE: 59 BPM

## 2019-02-11 DIAGNOSIS — M77.42 METATARSALGIA, LEFT FOOT: ICD-10-CM

## 2019-02-11 PROCEDURE — 99214 OFFICE O/P EST MOD 30 MIN: CPT | Performed by: FAMILY MEDICINE

## 2019-02-11 RX ORDER — MELOXICAM 7.5 MG/1
7.5 TABLET ORAL DAILY
Qty: 30 TAB | Refills: 3 | Status: SHIPPED | OUTPATIENT
Start: 2019-02-11 | End: 2019-05-30 | Stop reason: SDUPTHER

## 2019-02-11 ASSESSMENT — ENCOUNTER SYMPTOMS
CARDIOVASCULAR NEGATIVE: 1
PSYCHIATRIC NEGATIVE: 1
NEUROLOGICAL NEGATIVE: 1
GASTROINTESTINAL NEGATIVE: 1
TINGLING: 0
COUGH: 0
PALPITATIONS: 0
HEMOPTYSIS: 0
FEVER: 0
ABDOMINAL PAIN: 0
DIZZINESS: 0
HEADACHES: 0
EYES NEGATIVE: 1
HEARTBURN: 0
DEPRESSION: 0
MYALGIAS: 0
BRUISES/BLEEDS EASILY: 0
CONSTITUTIONAL NEGATIVE: 1
RESPIRATORY NEGATIVE: 1
NAUSEA: 0
BLURRED VISION: 0
ARTHRALGIAS: 1
DOUBLE VISION: 0
CHILLS: 0

## 2019-02-12 NOTE — PROGRESS NOTES
Subjective:      Shellie Dobbins is a 63 y.o. female who presents with Nodule (under heal x 8 weeks)            1. Metatarsalgia, left foot  - meloxicam (MOBIC) 7.5 MG Tab; Take 1 Tab by mouth every day.  Dispense: 30 Tab; Refill: 3  - DX-FOOT-COMPLETE 3+ LEFT; Future    Past Medical History:  2/8/2010: Atopic dermatitis  9/1/2011: Baker's cyst of knee  1999: Breast augmentation      Comment:  Saline  3/12/2014: DDD (degenerative disc disease), cervical  2/8/2010: Familial hyperlipidemia  3/12/2014: H/O tear of meniscus of knee joint      Comment:  Right knee. Had surgery 2011. Now left knee pain. Felt                great last year, working out. Overdid it working out.                Increased pain to point very swollen, hard to bend and                straighten left leg starting last week. Has felt like                about to give way when walking today. Has MRI scheduled.                Feels just like the right knee felt before surgery. No                brace. Not helping due to swelling. Taking ibuprofen. 600               mg bid.   No date: High cholesterol  9/1/2011: Medial meniscus tear  9/14/2013: Menopause      Comment:  2007  17yo: Mononucleosis      Comment:  Mono induced Hepatitis  9/14/2013: CLAIR (obstructive sleep apnea)      Comment:  AHI 17  In 2009. Uses nightgard  6/18/2011: Osteopenia  1/4/2013: Soft tissue mass  2/8/2010: Vitamin d deficiency  Past Surgical History:  9/21/2016: MASS EXCISION ORTHO; Bilateral      Comment:  Procedure: MASS EXCISION ORTHO - EXCISION POSTERIOR NECK               LIPOMA;  Surgeon: Troy Kapoor M.D.;  Location:                SURGERY [facility];  Service:   may 2014: MENISCECTOMY; Left      Comment:  Karlos - left knee  10/6/2011: KNEE ARTHROSCOPY      Comment:  Performed by ALEXANDRIA BRAR at [facility]  10/6/2011: MEDIAL MENISCECTOMY      Comment:  Performed by ALEXANDRIA BRAR at [facility]                 VICENTE ORS  2007: COLONOSCOPY  1999: MAMMOPLASTY AUGMENTATION      Comment:  saline  1997: TUBAL COAGULATION LAPAROSCOPIC BILATERAL  1984: PRIMARY C SECTION  Smoking status: Never Smoker                                                              Smokeless tobacco: Never Used                      Alcohol use: Yes           0.5 oz/week     Glasses of wine: 1 per week     Comment: 1-2 per week    Review of patient's family history indicates:  Problem: Stroke      Relation: Mother       Age of Onset: 75       Comment: d 86  Problem: Heart Disease      Relation: Father       Age of Onset: (Not Specified)       Comment: d. 59, rheum heart dz  Problem: Hyperlipidemia      Relation: Father       Age of Onset: (Not Specified)   Problem: Cancer      Relation: Sister       Age of Onset: (Not Specified)       Comment: Melanoma  Problem: Diabetes      Relation: Neg Hx       Age of Onset: (Not Specified)   Problem: Heart Disease      Relation: Sister       Age of Onset: (Not Specified)       Comment: aneurysm on heart      Current Outpatient Prescriptions: •  meloxicam (MOBIC) 7.5 MG Tab, Take 1 Tab by mouth every day., Disp: 30 Tab, Rfl: 3•  alendronate (FOSAMAX) 70 MG Tab, SEE DIRECTIONS ON  MEDICATION INFORMATION  SHEET WITH INVOICE  PAPERWORK, Disp: 12 Tab, Rfl: 1•  atorvastatin (LIPITOR) 40 MG Tab, TAKE 1 TABLET BY MOUTH  EVERY NIGHT AT BEDTIME, Disp: 90 Tab, Rfl: 1•  levothyroxine (SYNTHROID) 50 MCG Tab, TAKE 1 TABLET BY MOUTH EVERY MORNING ON AN EMPTY STOMACH, Disp: 90 Tab, Rfl: 0•  Multiple Vitamins-Minerals (MULTIVITAMIN PO), Take  by mouth every day., Disp: , Rfl: •  Cholecalciferol (VITAMIN D3) 2000 UNIT Cap, Take  by mouth every day., Disp: , Rfl: •  Potassium Gluconate 595 MG Cap, Take  by mouth every day., Disp: , Rfl: •  Calcium Carbonate (CALCIUM 600 PO), Take  by mouth every day., Disp: , Rfl: •  triamcinolone acetonide (KENALOG) 0.1 % Ointment, Thin layer to clean dry skin twice daily as needed for rash.,  Disp: 60 g, Rfl: 4•  ibuprofen (MOTRIN) 200 MG TABS, Take 600 mg by mouth every 8 hours as needed. Indications: Mild to Moderate Pain, Disp: , Rfl: •  levothyroxine (SYNTHROID) 50 MCG Tab, TAKE 1 TABLET BY MOUTH ONCE DAILY IN THE MORNING ON AN  EMPTY STOMACH, Disp: 90 Tab, Rfl: 1•  sulfamethoxazole-trimethoprim (BACTRIM DS) 800-160 MG tablet, Take 1 Tab by mouth 2 times a day., Disp: 20 Tab, Rfl: 0    Patient was instructed on the use of medications, either prescriptions or OTC and informed on when the appropriate follow up time period should be. In addition, patient was also instructed that should any acute worsening occur that they should notify this clinic asap or call 911.          Foot Problem   This is a new problem. The current episode started 1 to 4 weeks ago. The problem occurs constantly. The problem has been unchanged. Associated symptoms include arthralgias. Pertinent negatives include no abdominal pain, chest pain, chills, coughing, fever, headaches, myalgias, nausea or rash. The symptoms are aggravated by exertion and walking. She has tried rest for the symptoms. The treatment provided mild relief.       Review of Systems   Constitutional: Negative.  Negative for chills and fever.   HENT: Negative.  Negative for hearing loss.    Eyes: Negative.  Negative for blurred vision and double vision.   Respiratory: Negative.  Negative for cough and hemoptysis.    Cardiovascular: Negative.  Negative for chest pain and palpitations.   Gastrointestinal: Negative.  Negative for abdominal pain, heartburn and nausea.   Genitourinary: Negative.  Negative for dysuria.   Musculoskeletal: Positive for arthralgias and joint pain. Negative for myalgias.   Skin: Negative.  Negative for rash.   Neurological: Negative.  Negative for dizziness, tingling and headaches.   Endo/Heme/Allergies: Negative.  Does not bruise/bleed easily.   Psychiatric/Behavioral: Negative.  Negative for depression and suicidal ideas.   All other  "systems reviewed and are negative.         Objective:     /70 (BP Location: Left arm, Patient Position: Sitting)   Pulse (!) 59   Temp 36.4 °C (97.5 °F)   Resp 12   Ht 1.778 m (5' 10\")   Wt 81.6 kg (180 lb)   LMP 05/01/2008   SpO2 93%   BMI 25.83 kg/m²      Physical Exam   Constitutional: She is oriented to person, place, and time. She appears well-developed and well-nourished. No distress.   HENT:   Head: Normocephalic and atraumatic.   Mouth/Throat: Oropharynx is clear and moist. No oropharyngeal exudate.   Eyes: Pupils are equal, round, and reactive to light.   Cardiovascular: Normal rate, regular rhythm, normal heart sounds and intact distal pulses.  Exam reveals no gallop and no friction rub.    No murmur heard.  Pulmonary/Chest: Effort normal and breath sounds normal. No respiratory distress. She has no wheezes. She has no rales. She exhibits no tenderness.   Musculoskeletal:        Left foot: There is tenderness and swelling.        Feet:    Neurological: She is alert and oriented to person, place, and time.   Skin: She is not diaphoretic.   Psychiatric: She has a normal mood and affect. Her behavior is normal. Judgment and thought content normal.   Nursing note and vitals reviewed.              Assessment/Plan:     1. Metatarsalgia, left foot    - meloxicam (MOBIC) 7.5 MG Tab; Take 1 Tab by mouth every day.  Dispense: 30 Tab; Refill: 3  - DX-FOOT-COMPLETE 3+ LEFT; Future      "

## 2019-02-20 ENCOUNTER — HOSPITAL ENCOUNTER (OUTPATIENT)
Dept: RADIOLOGY | Facility: MEDICAL CENTER | Age: 64
End: 2019-02-20
Attending: FAMILY MEDICINE
Payer: COMMERCIAL

## 2019-02-20 DIAGNOSIS — M77.42 METATARSALGIA, LEFT FOOT: ICD-10-CM

## 2019-02-20 DIAGNOSIS — Z12.31 ENCOUNTER FOR SCREENING MAMMOGRAM FOR MALIGNANT NEOPLASM OF BREAST: ICD-10-CM

## 2019-02-20 PROCEDURE — 77063 BREAST TOMOSYNTHESIS BI: CPT

## 2019-02-20 PROCEDURE — 73630 X-RAY EXAM OF FOOT: CPT | Mod: LT

## 2019-03-11 ENCOUNTER — OFFICE VISIT (OUTPATIENT)
Dept: MEDICAL GROUP | Facility: PHYSICIAN GROUP | Age: 64
End: 2019-03-11
Payer: COMMERCIAL

## 2019-03-11 ENCOUNTER — HOSPITAL ENCOUNTER (OUTPATIENT)
Facility: MEDICAL CENTER | Age: 64
End: 2019-03-11
Attending: NURSE PRACTITIONER
Payer: COMMERCIAL

## 2019-03-11 VITALS
RESPIRATION RATE: 18 BRPM | HEIGHT: 70 IN | BODY MASS INDEX: 25.81 KG/M2 | WEIGHT: 180.3 LBS | SYSTOLIC BLOOD PRESSURE: 112 MMHG | DIASTOLIC BLOOD PRESSURE: 70 MMHG | TEMPERATURE: 96.6 F | HEART RATE: 65 BPM | OXYGEN SATURATION: 94 %

## 2019-03-11 DIAGNOSIS — Z00.00 ANNUAL PHYSICAL EXAM: ICD-10-CM

## 2019-03-11 DIAGNOSIS — Z12.11 SCREENING FOR COLORECTAL CANCER: ICD-10-CM

## 2019-03-11 DIAGNOSIS — Z12.12 SCREENING FOR COLORECTAL CANCER: ICD-10-CM

## 2019-03-11 DIAGNOSIS — Z12.4 SCREENING FOR MALIGNANT NEOPLASM OF CERVIX: ICD-10-CM

## 2019-03-11 PROCEDURE — 88175 CYTOPATH C/V AUTO FLUID REDO: CPT

## 2019-03-11 PROCEDURE — 87491 CHLMYD TRACH DNA AMP PROBE: CPT

## 2019-03-11 PROCEDURE — 99396 PREV VISIT EST AGE 40-64: CPT | Performed by: NURSE PRACTITIONER

## 2019-03-11 PROCEDURE — 87591 N.GONORRHOEAE DNA AMP PROB: CPT

## 2019-03-11 PROCEDURE — 87624 HPV HI-RISK TYP POOLED RSLT: CPT

## 2019-03-11 ASSESSMENT — PATIENT HEALTH QUESTIONNAIRE - PHQ9: CLINICAL INTERPRETATION OF PHQ2 SCORE: 0

## 2019-03-11 NOTE — PROGRESS NOTES
S:  Shellie Dobbins is a 63 y.o.,femalewho presents today for her Pap and GYN exam.  She has been menopausal since age: 49.      She is G:3, P:2.    She has not had an Abnormal Pap previously.  Her last Mammogram was done:  Feb 2019.  Her preventative health screens are up to date.    GYN ROS: no abnormal bleeding, pelvic pain or discharge, no breast pain or new or enlarging lumps on self exam    Patient Active Problem List    Diagnosis Date Noted   • Lipoma of neck 09/21/2016   • Posterior neck pain 06/27/2016   • H/O tear of meniscus of knee joint 03/12/2014   • DDD (degenerative disc disease), cervical 03/12/2014   • Menopause 09/14/2013   • CLAIR (obstructive sleep apnea) 09/14/2013   • Soft tissue mass 01/04/2013   • Osteoporosis 06/18/2011   • Preventative health care 05/11/2011   • Familial hyperlipidemia 02/08/2010   • Vitamin D insufficiency 02/08/2010   • Atopic dermatitis 02/08/2010     Current Outpatient Prescriptions on File Prior to Visit   Medication Sig Dispense Refill   • meloxicam (MOBIC) 7.5 MG Tab Take 1 Tab by mouth every day. 30 Tab 3   • alendronate (FOSAMAX) 70 MG Tab SEE DIRECTIONS ON  MEDICATION INFORMATION  SHEET WITH INVOICE  PAPERWORK 12 Tab 1   • levothyroxine (SYNTHROID) 50 MCG Tab TAKE 1 TABLET BY MOUTH ONCE DAILY IN THE MORNING ON AN  EMPTY STOMACH 90 Tab 1   • atorvastatin (LIPITOR) 40 MG Tab TAKE 1 TABLET BY MOUTH  EVERY NIGHT AT BEDTIME 90 Tab 1   • levothyroxine (SYNTHROID) 50 MCG Tab TAKE 1 TABLET BY MOUTH EVERY MORNING ON AN EMPTY STOMACH 90 Tab 0   • sulfamethoxazole-trimethoprim (BACTRIM DS) 800-160 MG tablet Take 1 Tab by mouth 2 times a day. 20 Tab 0   • Multiple Vitamins-Minerals (MULTIVITAMIN PO) Take  by mouth every day.     • Cholecalciferol (VITAMIN D3) 2000 UNIT Cap Take  by mouth every day.     • Potassium Gluconate 595 MG Cap Take  by mouth every day.     • Calcium Carbonate (CALCIUM 600 PO) Take  by mouth every day.     • triamcinolone acetonide (KENALOG) 0.1 %  "Ointment Thin layer to clean dry skin twice daily as needed for rash. 60 g 4   • ibuprofen (MOTRIN) 200 MG TABS Take 600 mg by mouth every 8 hours as needed. Indications: Mild to Moderate Pain       No current facility-administered medications on file prior to visit.      Social History   Substance Use Topics   • Smoking status: Never Smoker   • Smokeless tobacco: Never Used   • Alcohol use 0.5 oz/week     1 Glasses of wine per week      Comment: 1-2 per week       O: /70 (BP Location: Right arm, Patient Position: Sitting)   Pulse 65   Temp 35.9 °C (96.6 °F) (Temporal)   Resp 18   Ht 1.778 m (5' 10\")   Wt 81.8 kg (180 lb 4.8 oz)   LMP 05/01/2008   SpO2 94%   BMI 25.87 kg/m²   Vitals Noted and Reviewed  Breasts: exam deferred   Lungs: normal to percussion and auscultation  Cardiac: normal and regular rate and rhythm  Vulva: grossly unremarkable, no lesions or masses noted  Vagina: no abnormal discharge  Cervix: Parous, nonfriable, no surface lesions identified, Pap was performed  Uterus: Normal shape, position and consistency  Bimanual exam: No uteromegaly, negative chandelier sign, adnexa freely movable and without enlargements bilaterally    A:  NormalGYN Exam      P:    mammogram  pap smear  counseled on mammography screening, menopause, osteoporosis and adequate intake of calcium and vitamin D  return annually or prn    Pap was processed and sent to the lab    "

## 2019-03-13 DIAGNOSIS — Z12.4 SCREENING FOR MALIGNANT NEOPLASM OF CERVIX: ICD-10-CM

## 2019-03-13 DIAGNOSIS — Z00.00 ANNUAL PHYSICAL EXAM: ICD-10-CM

## 2019-03-15 LAB
C TRACH DNA GENITAL QL NAA+PROBE: NEGATIVE
CYTOLOGY REG CYTOL: NORMAL
HPV HR 12 DNA CVX QL NAA+PROBE: NEGATIVE
HPV16 DNA SPEC QL NAA+PROBE: NEGATIVE
HPV18 DNA SPEC QL NAA+PROBE: NEGATIVE
N GONORRHOEA DNA GENITAL QL NAA+PROBE: NEGATIVE
SPECIMEN SOURCE: NORMAL
SPECIMEN SOURCE: NORMAL

## 2019-03-18 ENCOUNTER — TELEPHONE (OUTPATIENT)
Dept: MEDICAL GROUP | Facility: PHYSICIAN GROUP | Age: 64
End: 2019-03-18

## 2019-03-18 NOTE — TELEPHONE ENCOUNTER
----- Message from GAYLE Casiano sent at 3/15/2019  5:33 PM PDT -----  Pap smear results show no abnormal findings, including cancer or infection. Routine screening in 3 years is recommended.    GAYLE Casiano,DILIA-C

## 2019-05-09 DIAGNOSIS — M81.8 OTHER OSTEOPOROSIS WITHOUT CURRENT PATHOLOGICAL FRACTURE: ICD-10-CM

## 2019-05-09 DIAGNOSIS — E78.49 FAMILIAL HYPERLIPIDEMIA: ICD-10-CM

## 2019-05-10 DIAGNOSIS — Z76.0 MEDICATION REFILL: ICD-10-CM

## 2019-05-10 DIAGNOSIS — E03.4 HYPOTHYROIDISM DUE TO ACQUIRED ATROPHY OF THYROID: ICD-10-CM

## 2019-05-10 DIAGNOSIS — E55.9 VITAMIN D INSUFFICIENCY: ICD-10-CM

## 2019-05-10 DIAGNOSIS — E78.49 FAMILIAL HYPERLIPIDEMIA: ICD-10-CM

## 2019-05-11 NOTE — TELEPHONE ENCOUNTER
Was the patient seen in the last year in this department? Yes    Does patient have an active prescription for medications requested? Yes    Received Request Via: Pharmacy     Last Visit:03/11/2019  Last Lab:10/23/2018

## 2019-05-13 RX ORDER — ALENDRONATE SODIUM 70 MG/1
TABLET ORAL
Qty: 12 TAB | Refills: 0 | Status: SHIPPED | OUTPATIENT
Start: 2019-05-13 | End: 2019-05-30 | Stop reason: SDUPTHER

## 2019-05-13 RX ORDER — LEVOTHYROXINE SODIUM 0.05 MG/1
TABLET ORAL
Qty: 90 TAB | Refills: 3 | Status: SHIPPED | OUTPATIENT
Start: 2019-05-13 | End: 2019-05-30 | Stop reason: SDUPTHER

## 2019-05-13 RX ORDER — ATORVASTATIN CALCIUM 40 MG/1
TABLET, FILM COATED ORAL
Qty: 90 TAB | Refills: 3 | Status: SHIPPED | OUTPATIENT
Start: 2019-05-13 | End: 2019-05-30 | Stop reason: SDUPTHER

## 2019-05-22 ENCOUNTER — HOSPITAL ENCOUNTER (OUTPATIENT)
Dept: LAB | Facility: MEDICAL CENTER | Age: 64
End: 2019-05-22
Attending: FAMILY MEDICINE
Payer: COMMERCIAL

## 2019-05-22 DIAGNOSIS — D70.8 OTHER NEUTROPENIA (HCC): ICD-10-CM

## 2019-05-22 DIAGNOSIS — E74.39 GLUCOSE INTOLERANCE: ICD-10-CM

## 2019-05-22 DIAGNOSIS — E55.9 VITAMIN D DEFICIENCY: ICD-10-CM

## 2019-05-22 LAB
25(OH)D3 SERPL-MCNC: 42 NG/ML (ref 30–100)
ALBUMIN SERPL BCP-MCNC: 4.2 G/DL (ref 3.2–4.9)
ALBUMIN/GLOB SERPL: 1.4 G/DL
ALP SERPL-CCNC: 50 U/L (ref 30–99)
ALT SERPL-CCNC: 24 U/L (ref 2–50)
ANION GAP SERPL CALC-SCNC: 5 MMOL/L (ref 0–11.9)
AST SERPL-CCNC: 23 U/L (ref 12–45)
BASOPHILS # BLD AUTO: 0.9 % (ref 0–1.8)
BASOPHILS # BLD: 0.04 K/UL (ref 0–0.12)
BILIRUB SERPL-MCNC: 0.5 MG/DL (ref 0.1–1.5)
BUN SERPL-MCNC: 20 MG/DL (ref 8–22)
CALCIUM SERPL-MCNC: 9.5 MG/DL (ref 8.5–10.5)
CHLORIDE SERPL-SCNC: 104 MMOL/L (ref 96–112)
CO2 SERPL-SCNC: 28 MMOL/L (ref 20–33)
CREAT SERPL-MCNC: 0.79 MG/DL (ref 0.5–1.4)
EOSINOPHIL # BLD AUTO: 0.11 K/UL (ref 0–0.51)
EOSINOPHIL NFR BLD: 2.3 % (ref 0–6.9)
ERYTHROCYTE [DISTWIDTH] IN BLOOD BY AUTOMATED COUNT: 50.3 FL (ref 35.9–50)
EST. AVERAGE GLUCOSE BLD GHB EST-MCNC: 120 MG/DL
FASTING STATUS PATIENT QL REPORTED: NORMAL
GLOBULIN SER CALC-MCNC: 3.1 G/DL (ref 1.9–3.5)
GLUCOSE SERPL-MCNC: 102 MG/DL (ref 65–99)
HBA1C MFR BLD: 5.8 % (ref 0–5.6)
HCT VFR BLD AUTO: 45.8 % (ref 37–47)
HGB BLD-MCNC: 14.8 G/DL (ref 12–16)
IMM GRANULOCYTES # BLD AUTO: 0.01 K/UL (ref 0–0.11)
IMM GRANULOCYTES NFR BLD AUTO: 0.2 % (ref 0–0.9)
LYMPHOCYTES # BLD AUTO: 1.65 K/UL (ref 1–4.8)
LYMPHOCYTES NFR BLD: 35.2 % (ref 22–41)
MCH RBC QN AUTO: 30.8 PG (ref 27–33)
MCHC RBC AUTO-ENTMCNC: 32.3 G/DL (ref 33.6–35)
MCV RBC AUTO: 95.2 FL (ref 81.4–97.8)
MONOCYTES # BLD AUTO: 0.52 K/UL (ref 0–0.85)
MONOCYTES NFR BLD AUTO: 11.1 % (ref 0–13.4)
NEUTROPHILS # BLD AUTO: 2.36 K/UL (ref 2–7.15)
NEUTROPHILS NFR BLD: 50.3 % (ref 44–72)
NRBC # BLD AUTO: 0 K/UL
NRBC BLD-RTO: 0 /100 WBC
PLATELET # BLD AUTO: 193 K/UL (ref 164–446)
PMV BLD AUTO: 12.1 FL (ref 9–12.9)
POTASSIUM SERPL-SCNC: 4.6 MMOL/L (ref 3.6–5.5)
PROT SERPL-MCNC: 7.3 G/DL (ref 6–8.2)
RBC # BLD AUTO: 4.81 M/UL (ref 4.2–5.4)
SODIUM SERPL-SCNC: 137 MMOL/L (ref 135–145)
WBC # BLD AUTO: 4.7 K/UL (ref 4.8–10.8)

## 2019-05-22 PROCEDURE — 83036 HEMOGLOBIN GLYCOSYLATED A1C: CPT

## 2019-05-22 PROCEDURE — 82306 VITAMIN D 25 HYDROXY: CPT

## 2019-05-22 PROCEDURE — 36415 COLL VENOUS BLD VENIPUNCTURE: CPT

## 2019-05-22 PROCEDURE — 80053 COMPREHEN METABOLIC PANEL: CPT

## 2019-05-22 PROCEDURE — 85025 COMPLETE CBC W/AUTO DIFF WBC: CPT

## 2019-05-23 ENCOUNTER — TELEPHONE (OUTPATIENT)
Dept: MEDICAL GROUP | Facility: PHYSICIAN GROUP | Age: 64
End: 2019-05-23

## 2019-05-23 NOTE — TELEPHONE ENCOUNTER
----- Message from Faisal Patel M.D. sent at 5/23/2019  3:42 PM PDT -----  This patient needs an appointment within the next week. Please schedule this with the patient so we may discuss their lab results

## 2019-05-24 ENCOUNTER — HOSPITAL ENCOUNTER (OUTPATIENT)
Facility: MEDICAL CENTER | Age: 64
End: 2019-05-24
Attending: NURSE PRACTITIONER
Payer: COMMERCIAL

## 2019-05-24 PROCEDURE — 82274 ASSAY TEST FOR BLOOD FECAL: CPT

## 2019-05-25 DIAGNOSIS — Z12.11 SCREENING FOR COLORECTAL CANCER: ICD-10-CM

## 2019-05-25 DIAGNOSIS — Z12.12 SCREENING FOR COLORECTAL CANCER: ICD-10-CM

## 2019-05-26 LAB — HEMOCCULT STL QL IA: NEGATIVE

## 2019-05-28 ENCOUNTER — TELEPHONE (OUTPATIENT)
Dept: MEDICAL GROUP | Facility: PHYSICIAN GROUP | Age: 64
End: 2019-05-28

## 2019-05-28 NOTE — TELEPHONE ENCOUNTER
----- Message from GAYLE Casiano sent at 5/26/2019  5:16 PM PDT -----  Fecal occult test negative for the presence of blood in stool. Routine screening in 1 year is recommended.    GAYLE Casiano,RABIAP-C

## 2019-05-30 ENCOUNTER — OFFICE VISIT (OUTPATIENT)
Dept: MEDICAL GROUP | Facility: PHYSICIAN GROUP | Age: 64
End: 2019-05-30
Payer: COMMERCIAL

## 2019-05-30 VITALS
HEART RATE: 57 BPM | TEMPERATURE: 97 F | RESPIRATION RATE: 12 BRPM | HEIGHT: 70 IN | BODY MASS INDEX: 26.48 KG/M2 | DIASTOLIC BLOOD PRESSURE: 62 MMHG | WEIGHT: 185 LBS | OXYGEN SATURATION: 95 % | SYSTOLIC BLOOD PRESSURE: 122 MMHG

## 2019-05-30 DIAGNOSIS — E78.49 FAMILIAL HYPERLIPIDEMIA: ICD-10-CM

## 2019-05-30 DIAGNOSIS — E03.4 HYPOTHYROIDISM DUE TO ACQUIRED ATROPHY OF THYROID: ICD-10-CM

## 2019-05-30 DIAGNOSIS — E74.39 GLUCOSE INTOLERANCE: ICD-10-CM

## 2019-05-30 DIAGNOSIS — Z76.0 MEDICATION REFILL: ICD-10-CM

## 2019-05-30 DIAGNOSIS — M81.8 OTHER OSTEOPOROSIS WITHOUT CURRENT PATHOLOGICAL FRACTURE: ICD-10-CM

## 2019-05-30 DIAGNOSIS — M77.42 METATARSALGIA, LEFT FOOT: ICD-10-CM

## 2019-05-30 DIAGNOSIS — E55.9 VITAMIN D INSUFFICIENCY: ICD-10-CM

## 2019-05-30 PROCEDURE — 99214 OFFICE O/P EST MOD 30 MIN: CPT | Performed by: FAMILY MEDICINE

## 2019-05-30 RX ORDER — ATORVASTATIN CALCIUM 40 MG/1
TABLET, FILM COATED ORAL
Qty: 90 TAB | Refills: 3 | Status: SHIPPED | OUTPATIENT
Start: 2019-05-30 | End: 2020-04-07 | Stop reason: SDUPTHER

## 2019-05-30 RX ORDER — MELOXICAM 7.5 MG/1
7.5 TABLET ORAL DAILY
Qty: 90 TAB | Refills: 1 | Status: SHIPPED | OUTPATIENT
Start: 2019-05-30 | End: 2022-02-01

## 2019-05-30 RX ORDER — ALENDRONATE SODIUM 70 MG/1
TABLET ORAL
Qty: 12 TAB | Refills: 0 | Status: SHIPPED | OUTPATIENT
Start: 2019-05-30 | End: 2019-08-24 | Stop reason: SDUPTHER

## 2019-05-30 RX ORDER — LEVOTHYROXINE SODIUM 0.05 MG/1
TABLET ORAL
Qty: 90 TAB | Refills: 3 | Status: SHIPPED | OUTPATIENT
Start: 2019-05-30 | End: 2022-02-01

## 2019-05-30 ASSESSMENT — ENCOUNTER SYMPTOMS
CARDIOVASCULAR NEGATIVE: 1
DOUBLE VISION: 0
MYALGIAS: 0
DIZZINESS: 0
NAUSEA: 0
MUSCULOSKELETAL NEGATIVE: 1
HEMOPTYSIS: 0
CONSTITUTIONAL NEGATIVE: 1
HEADACHES: 0
HEARTBURN: 0
DEPRESSION: 0
NEUROLOGICAL NEGATIVE: 1
BLURRED VISION: 0
CHILLS: 0
TINGLING: 0
PSYCHIATRIC NEGATIVE: 1
GASTROINTESTINAL NEGATIVE: 1
FEVER: 0
BRUISES/BLEEDS EASILY: 0
COUGH: 0
PALPITATIONS: 0
EYES NEGATIVE: 1
RESPIRATORY NEGATIVE: 1

## 2019-05-30 NOTE — PROGRESS NOTES
Subjective:      Shellie Dobbins is a 63 y.o. female who presents with Hypertension (go over labs)            1. Medication refill    - alendronate (FOSAMAX) 70 MG Tab; TAKE 1 TABLET WEEKLY ON AN  EMPTY STOMACH WITH GLASS OF WATER. SIT UPRIGHT AND NO  FOOD OR OTHER MEDS FOR 30  MINUTES  Dispense: 12 Tab; Refill: 0    2. Glucose intolerance  a1c slightly high will repeat in 6 mo with poct  Due to the patient's issues with glucose management, today they were extensively counseled on a low carb diet and exercise and losing weight      3. Familial hyperlipidemia  Currently treated for HLD, taking meds with no new myalgias or joint pain, watching fats in diet  controlled    - alendronate (FOSAMAX) 70 MG Tab; TAKE 1 TABLET WEEKLY ON AN  EMPTY STOMACH WITH GLASS OF WATER. SIT UPRIGHT AND NO  FOOD OR OTHER MEDS FOR 30  MINUTES  Dispense: 12 Tab; Refill: 0  - atorvastatin (LIPITOR) 40 MG Tab; TAKE 1 TABLET BY MOUTH  EVERY NIGHT AT BEDTIME  Dispense: 90 Tab; Refill: 3    4. Vitamin D insufficiency  Patient is currently treated for vitamin D deficiency with supplemental vitamin d, increase sunlight and dairy products. No current issues with treatment  controlled    - alendronate (FOSAMAX) 70 MG Tab; TAKE 1 TABLET WEEKLY ON AN  EMPTY STOMACH WITH GLASS OF WATER. SIT UPRIGHT AND NO  FOOD OR OTHER MEDS FOR 30  MINUTES  Dispense: 12 Tab; Refill: 0    5. Hypothyroidism due to acquired atrophy of thyroid  Patient is being treated for hypothyroidism, currently taking meds, no symptoms of fast or slow heartbeat and energy level steady. No new hair loss or skin symptoms. Labs have been checked in past year and are stable on current dose.  controlled    - alendronate (FOSAMAX) 70 MG Tab; TAKE 1 TABLET WEEKLY ON AN  EMPTY STOMACH WITH GLASS OF WATER. SIT UPRIGHT AND NO  FOOD OR OTHER MEDS FOR 30  MINUTES  Dispense: 12 Tab; Refill: 0    6. Other osteoporosis without current pathological fracture    - levothyroxine (SYNTHROID) 50 MCG  Tab; TAKE 1 TABLET BY MOUTH ONCE DAILY IN THE MORNING ON AN  EMPTY STOMACH  Dispense: 90 Tab; Refill: 3    Past Medical History:  2/8/2010: Atopic dermatitis  9/1/2011: Baker's cyst of knee  1999: Breast augmentation      Comment:  Saline  3/12/2014: DDD (degenerative disc disease), cervical  2/8/2010: Familial hyperlipidemia  3/12/2014: H/O tear of meniscus of knee joint      Comment:  Right knee. Had surgery 2011. Now left knee pain. Felt                great last year, working out. Overdid it working out.                Increased pain to point very swollen, hard to bend and                straighten left leg starting last week. Has felt like                about to give way when walking today. Has MRI scheduled.                Feels just like the right knee felt before surgery. No                brace. Not helping due to swelling. Taking ibuprofen. 600               mg bid.   No date: High cholesterol  9/1/2011: Medial meniscus tear  9/14/2013: Menopause      Comment:  2007  19yo: Mononucleosis      Comment:  Mono induced Hepatitis  9/14/2013: CLAIR (obstructive sleep apnea)      Comment:  AHI 17  In 2009. Uses nightgard  6/18/2011: Osteopenia  1/4/2013: Soft tissue mass  2/8/2010: Vitamin d deficiency  Past Surgical History:  9/21/2016: MASS EXCISION ORTHO; Bilateral      Comment:  Procedure: MASS EXCISION ORTHO - EXCISION POSTERIOR NECK               LIPOMA;  Surgeon: Troy Kapoor M.D.;  Location:                SURGERY [facility];  Service:   may 2014: MENISCECTOMY; Left      Comment:  Karlos - left knee  10/6/2011: KNEE ARTHROSCOPY      Comment:  Performed by ALEXANDRIA BRAR at SURGERY [facility]  10/6/2011: MEDIAL MENISCECTOMY      Comment:  Performed by ALEXANDRIA BRAR at [facility]  2007: COLONOSCOPY  1999: MAMMOPLASTY AUGMENTATION      Comment:  saline  1997: TUBAL COAGULATION LAPAROSCOPIC BILATERAL  1984: PRIMARY C SECTION  No  date: PB ENLARGE BREAST WITH IMPLANT  Smoking status: Never Smoker                                                              Smokeless tobacco: Never Used                      Alcohol use: Yes           0.5 oz/week     Glasses of wine: 1 per week     Comment: 1-2 per week    Review of patient's family history indicates:  Problem: Stroke      Relation: Mother       Age of Onset: 75       Comment: d 86  Problem: Heart Disease      Relation: Father       Age of Onset: (Not Specified)       Comment: d. 59, rheum heart dz  Problem: Hyperlipidemia      Relation: Father       Age of Onset: (Not Specified)   Problem: Cancer      Relation: Sister       Age of Onset: (Not Specified)       Comment: Melanoma  Problem: Heart Disease      Relation: Sister       Age of Onset: (Not Specified)       Comment: aneurysm on heart  Problem: Diabetes      Relation: Neg Hx       Age of Onset: (Not Specified)       Current Outpatient Prescriptions: •  alendronate (FOSAMAX) 70 MG Tab, TAKE 1 TABLET WEEKLY ON AN  EMPTY STOMACH WITH GLASS OF WATER. SIT UPRIGHT AND NO  FOOD OR OTHER MEDS FOR 30  MINUTES, Disp: 12 Tab, Rfl: 0•  atorvastatin (LIPITOR) 40 MG Tab, TAKE 1 TABLET BY MOUTH  EVERY NIGHT AT BEDTIME, Disp: 90 Tab, Rfl: 3•  levothyroxine (SYNTHROID) 50 MCG Tab, TAKE 1 TABLET BY MOUTH ONCE DAILY IN THE MORNING ON AN  EMPTY STOMACH, Disp: 90 Tab, Rfl: 3•  meloxicam (MOBIC) 7.5 MG Tab, Take 1 Tab by mouth every day., Disp: 30 Tab, Rfl: 3•  levothyroxine (SYNTHROID) 50 MCG Tab, TAKE 1 TABLET BY MOUTH EVERY MORNING ON AN EMPTY STOMACH, Disp: 90 Tab, Rfl: 0•  sulfamethoxazole-trimethoprim (BACTRIM DS) 800-160 MG tablet, Take 1 Tab by mouth 2 times a day., Disp: 20 Tab, Rfl: 0•  Multiple Vitamins-Minerals (MULTIVITAMIN PO), Take  by mouth every day., Disp: , Rfl: •  Cholecalciferol (VITAMIN D3) 2000 UNIT Cap, Take  by mouth every day., Disp: , Rfl: •  Potassium Gluconate 595 MG Cap, Take  by mouth every day., Disp: , Rfl: •  Calcium Carbonate  "(CALCIUM 600 PO), Take  by mouth every day., Disp: , Rfl: •  triamcinolone acetonide (KENALOG) 0.1 % Ointment, Thin layer to clean dry skin twice daily as needed for rash., Disp: 60 g, Rfl: 4•  ibuprofen (MOTRIN) 200 MG TABS, Take 600 mg by mouth every 8 hours as needed. Indications: Mild to Moderate Pain, Disp: , Rfl:     Patient was instructed on the use of medications, either prescriptions or OTC and informed on when the appropriate follow up time period should be. In addition, patient was also instructed that should any acute worsening occur that they should notify this clinic asap or call 911.            Review of Systems   Constitutional: Negative.  Negative for chills and fever.   HENT: Negative.  Negative for hearing loss.    Eyes: Negative.  Negative for blurred vision and double vision.   Respiratory: Negative.  Negative for cough and hemoptysis.    Cardiovascular: Negative.  Negative for chest pain and palpitations.   Gastrointestinal: Negative.  Negative for heartburn and nausea.   Genitourinary: Negative.  Negative for dysuria.   Musculoskeletal: Negative.  Negative for myalgias.   Skin: Negative.  Negative for rash.   Neurological: Negative.  Negative for dizziness, tingling and headaches.   Endo/Heme/Allergies: Negative.  Does not bruise/bleed easily.   Psychiatric/Behavioral: Negative.  Negative for depression and suicidal ideas.   All other systems reviewed and are negative.         Objective:     /62   Pulse (!) 57   Temp 36.1 °C (97 °F)   Resp 12   Ht 1.778 m (5' 10\")   Wt 83.9 kg (185 lb)   LMP 05/01/2008   SpO2 95%   BMI 26.54 kg/m²      Physical Exam   Constitutional: She is oriented to person, place, and time. She appears well-developed and well-nourished. No distress.   HENT:   Head: Normocephalic and atraumatic.   Mouth/Throat: Oropharynx is clear and moist. No oropharyngeal exudate.   Eyes: Pupils are equal, round, and reactive to light.   Cardiovascular: Normal rate, regular " rhythm, normal heart sounds and intact distal pulses.  Exam reveals no gallop and no friction rub.    No murmur heard.  Pulmonary/Chest: Effort normal and breath sounds normal. No respiratory distress. She has no wheezes. She has no rales. She exhibits no tenderness.   Neurological: She is alert and oriented to person, place, and time.   Skin: She is not diaphoretic.   Psychiatric: She has a normal mood and affect. Her behavior is normal. Judgment and thought content normal.   Nursing note and vitals reviewed.              Assessment/Plan:     1. Medication refill    - alendronate (FOSAMAX) 70 MG Tab; TAKE 1 TABLET WEEKLY ON AN  EMPTY STOMACH WITH GLASS OF WATER. SIT UPRIGHT AND NO  FOOD OR OTHER MEDS FOR 30  MINUTES  Dispense: 12 Tab; Refill: 0    2. Glucose intolerance      3. Familial hyperlipidemia    - alendronate (FOSAMAX) 70 MG Tab; TAKE 1 TABLET WEEKLY ON AN  EMPTY STOMACH WITH GLASS OF WATER. SIT UPRIGHT AND NO  FOOD OR OTHER MEDS FOR 30  MINUTES  Dispense: 12 Tab; Refill: 0  - atorvastatin (LIPITOR) 40 MG Tab; TAKE 1 TABLET BY MOUTH  EVERY NIGHT AT BEDTIME  Dispense: 90 Tab; Refill: 3    4. Vitamin D insufficiency    - alendronate (FOSAMAX) 70 MG Tab; TAKE 1 TABLET WEEKLY ON AN  EMPTY STOMACH WITH GLASS OF WATER. SIT UPRIGHT AND NO  FOOD OR OTHER MEDS FOR 30  MINUTES  Dispense: 12 Tab; Refill: 0    5. Hypothyroidism due to acquired atrophy of thyroid    - alendronate (FOSAMAX) 70 MG Tab; TAKE 1 TABLET WEEKLY ON AN  EMPTY STOMACH WITH GLASS OF WATER. SIT UPRIGHT AND NO  FOOD OR OTHER MEDS FOR 30  MINUTES  Dispense: 12 Tab; Refill: 0    6. Other osteoporosis without current pathological fracture  - levothyroxine (SYNTHROID) 50 MCG Tab; TAKE 1 TABLET BY MOUTH ONCE DAILY IN THE MORNING ON AN  EMPTY STOMACH  Dispense: 90 Tab; Refill: 3

## 2019-08-24 DIAGNOSIS — E55.9 VITAMIN D INSUFFICIENCY: ICD-10-CM

## 2019-08-24 DIAGNOSIS — E03.4 HYPOTHYROIDISM DUE TO ACQUIRED ATROPHY OF THYROID: ICD-10-CM

## 2019-08-24 DIAGNOSIS — Z76.0 MEDICATION REFILL: ICD-10-CM

## 2019-08-24 DIAGNOSIS — E78.49 FAMILIAL HYPERLIPIDEMIA: ICD-10-CM

## 2019-08-27 RX ORDER — ALENDRONATE SODIUM 70 MG/1
TABLET ORAL
Qty: 12 TAB | Refills: 1 | Status: SHIPPED | OUTPATIENT
Start: 2019-08-27 | End: 2020-01-13

## 2019-08-27 NOTE — TELEPHONE ENCOUNTER
Was the patient seen in the last year in this department? Yes    Does patient have an active prescription for medications requested? Yes    Received Request Via: Pharmacy     Last visit 5/30/2019  Last labs 5/22/2019

## 2019-09-06 ENCOUNTER — NON-PROVIDER VISIT (OUTPATIENT)
Dept: URGENT CARE | Facility: PHYSICIAN GROUP | Age: 64
End: 2019-09-06

## 2019-09-06 DIAGNOSIS — Z02.1 PRE-EMPLOYMENT DRUG SCREENING: ICD-10-CM

## 2019-09-06 LAB
AMP AMPHETAMINE: NORMAL
COC COCAINE: NORMAL
INT CON NEG: NEGATIVE
INT CON POS: POSITIVE
MET METHAMPHETAMINES: NORMAL
OPI OPIATES: NORMAL
PCP PHENCYCLIDINE: NORMAL
POC DRUG COMMENT 753798-OCCUPATIONAL HEALTH: NORMAL
THC: NORMAL

## 2019-09-06 PROCEDURE — 80305 DRUG TEST PRSMV DIR OPT OBS: CPT | Performed by: FAMILY MEDICINE

## 2019-12-04 ENCOUNTER — OFFICE VISIT (OUTPATIENT)
Dept: MEDICAL GROUP | Facility: PHYSICIAN GROUP | Age: 64
End: 2019-12-04
Payer: COMMERCIAL

## 2019-12-04 VITALS
OXYGEN SATURATION: 97 % | SYSTOLIC BLOOD PRESSURE: 114 MMHG | BODY MASS INDEX: 26.48 KG/M2 | WEIGHT: 185 LBS | DIASTOLIC BLOOD PRESSURE: 76 MMHG | RESPIRATION RATE: 14 BRPM | HEART RATE: 68 BPM | TEMPERATURE: 98.6 F | HEIGHT: 70 IN

## 2019-12-04 DIAGNOSIS — E78.49 FAMILIAL HYPERLIPIDEMIA: ICD-10-CM

## 2019-12-04 DIAGNOSIS — J20.9 ACUTE BRONCHITIS, UNSPECIFIED ORGANISM: ICD-10-CM

## 2019-12-04 DIAGNOSIS — E74.39 GLUCOSE INTOLERANCE: ICD-10-CM

## 2019-12-04 PROCEDURE — 99214 OFFICE O/P EST MOD 30 MIN: CPT | Performed by: FAMILY MEDICINE

## 2019-12-04 RX ORDER — AZITHROMYCIN 250 MG/1
TABLET, FILM COATED ORAL
Qty: 6 TAB | Refills: 0 | Status: SHIPPED | OUTPATIENT
Start: 2019-12-04 | End: 2019-12-10

## 2019-12-04 NOTE — PROGRESS NOTES
Chief Complaint   Patient presents with   • Cough   • Annual Exam        HPI: Patient is a 64 y.o. female complaining of 10 days of illness including: nasal congestion.   Mucus is: green.  Similarly ill exposures: no.  Treatments tried: otc  She  reports that she has never smoked. She has never used smokeless tobacco..     ROS:  No fever,  +cough, nausea, changes in bowel movements or skin rash.      I reviewed the patient's medications, allergies and medical history:  Current Outpatient Medications   Medication Sig Dispense Refill   • azithromycin (ZITHROMAX Z-LEODAN) 250 MG Tab Take 2 tabs (500 mg) on day one, then 1 tab (250 mg) on days 2-5 6 Tab 0   • alendronate (FOSAMAX) 70 MG Tab TAKE 1 TABLET BY MOUTH WEEKLY ON EMPTY STOMACH WITH GLASS OF WATER SIT UP, NO FOOD OR MEDS FOR 30 MINUTES 12 Tab 1   • atorvastatin (LIPITOR) 40 MG Tab TAKE 1 TABLET BY MOUTH  EVERY NIGHT AT BEDTIME 90 Tab 3   • levothyroxine (SYNTHROID) 50 MCG Tab TAKE 1 TABLET BY MOUTH ONCE DAILY IN THE MORNING ON AN  EMPTY STOMACH 90 Tab 3   • meloxicam (MOBIC) 7.5 MG Tab Take 1 Tab by mouth every day. 90 Tab 1   • Multiple Vitamins-Minerals (MULTIVITAMIN PO) Take  by mouth every day.     • Cholecalciferol (VITAMIN D3) 2000 UNIT Cap Take  by mouth every day.     • Potassium Gluconate 595 MG Cap Take  by mouth every day.     • Calcium Carbonate (CALCIUM 600 PO) Take  by mouth every day.     • levothyroxine (SYNTHROID) 50 MCG Tab TAKE 1 TABLET BY MOUTH EVERY MORNING ON AN EMPTY STOMACH 90 Tab 0   • sulfamethoxazole-trimethoprim (BACTRIM DS) 800-160 MG tablet Take 1 Tab by mouth 2 times a day. 20 Tab 0   • triamcinolone acetonide (KENALOG) 0.1 % Ointment Thin layer to clean dry skin twice daily as needed for rash. 60 g 4   • ibuprofen (MOTRIN) 200 MG TABS Take 600 mg by mouth every 8 hours as needed. Indications: Mild to Moderate Pain       No current facility-administered medications for this visit.      Patient has no known allergies.  Past Medical  "History:   Diagnosis Date   • Atopic dermatitis 2/8/2010   • Baker's cyst of knee 9/1/2011   • Breast augmentation 1999    Saline   • DDD (degenerative disc disease), cervical 3/12/2014   • Familial hyperlipidemia 2/8/2010   • H/O tear of meniscus of knee joint 3/12/2014    Right knee. Had surgery 2011. Now left knee pain. Felt great last year, working out. Overdid it working out. Increased pain to point very swollen, hard to bend and straighten left leg starting last week. Has felt like about to give way when walking today. Has MRI scheduled. Feels just like the right knee felt before surgery. No brace. Not helping due to swelling. Taking ibuprofen. 600 mg bid.    • High cholesterol    • Medial meniscus tear 9/1/2011   • Menopause 9/14/2013 2007   • Mononucleosis 19yo    Mono induced Hepatitis   • CLAIR (obstructive sleep apnea) 9/14/2013    AHI 17  In 2009. Uses nightgard   • Osteopenia 6/18/2011   • Soft tissue mass 1/4/2013   • Vitamin d deficiency 2/8/2010        EXAM:  /76 (BP Location: Left arm, Patient Position: Sitting, BP Cuff Size: Adult)   Pulse 68   Temp 37 °C (98.6 °F) (Temporal)   Resp 14   Ht 1.778 m (5' 10\")   Wt 83.9 kg (185 lb)   SpO2 97%   General: Alert, no conversational dyspnea or audible wheeze, non-toxic appearance.  Eyes: PERRL, conjunctiva slightly injected, no eye discharge.  Ears: Normal pinnae,TM's normal bilaterally.  Nares: Patent with thin mucus.  Sinuses: non tender over maxillary / frontal sinuses.  Throat: Erythematous injection without exudate.   Neck: Supple, with no adenopathy.  Lungs: Clear to auscultation bilaterally, no wheeze, crackles or rhonchi. Patient today has a loose cough but on auscultation has no evident wheezes,rales or rhonchi    Heart: Regular rate without murmur.  Skin: Warm and dry without rash.     ASSESSMENT:   1. Acute bronchitis, unspecified organism          PLAN:  1. Educated patient that majority of upper respiratory infections are viral " and do not need antibiotics. As symptoms have been worsening over the last week, will treat with antibiotics.  2. Twice daily use of nasal saline rinse or Neti-Pot.  3. OTC anti-pyretics and decongestants as needed.  4. Follow-up in office or urgent care for worsening symptoms, difficulty breathing, lack of expected recovery, or should new symptoms or problems arise.

## 2020-01-10 DIAGNOSIS — E55.9 VITAMIN D INSUFFICIENCY: ICD-10-CM

## 2020-01-10 DIAGNOSIS — E03.4 HYPOTHYROIDISM DUE TO ACQUIRED ATROPHY OF THYROID: ICD-10-CM

## 2020-01-10 DIAGNOSIS — E78.49 FAMILIAL HYPERLIPIDEMIA: ICD-10-CM

## 2020-01-10 DIAGNOSIS — Z76.0 MEDICATION REFILL: ICD-10-CM

## 2020-01-13 RX ORDER — ALENDRONATE SODIUM 70 MG/1
TABLET ORAL
Qty: 12 TAB | Refills: 0 | Status: SHIPPED | OUTPATIENT
Start: 2020-01-13 | End: 2020-04-07 | Stop reason: SDUPTHER

## 2020-01-21 ENCOUNTER — HOSPITAL ENCOUNTER (OUTPATIENT)
Dept: LAB | Facility: MEDICAL CENTER | Age: 65
End: 2020-01-21
Attending: FAMILY MEDICINE
Payer: COMMERCIAL

## 2020-01-21 DIAGNOSIS — E74.39 GLUCOSE INTOLERANCE: ICD-10-CM

## 2020-01-21 DIAGNOSIS — J20.9 ACUTE BRONCHITIS, UNSPECIFIED ORGANISM: ICD-10-CM

## 2020-01-21 DIAGNOSIS — E78.49 FAMILIAL HYPERLIPIDEMIA: ICD-10-CM

## 2020-01-21 LAB
ERYTHROCYTE [DISTWIDTH] IN BLOOD BY AUTOMATED COUNT: 49.7 FL (ref 35.9–50)
EST. AVERAGE GLUCOSE BLD GHB EST-MCNC: 120 MG/DL
HBA1C MFR BLD: 5.8 % (ref 0–5.6)
HCT VFR BLD AUTO: 41.7 % (ref 37–47)
HGB BLD-MCNC: 13.9 G/DL (ref 12–16)
MCH RBC QN AUTO: 31.6 PG (ref 27–33)
MCHC RBC AUTO-ENTMCNC: 33.3 G/DL (ref 33.6–35)
MCV RBC AUTO: 94.8 FL (ref 81.4–97.8)
PLATELET # BLD AUTO: 200 K/UL (ref 164–446)
PMV BLD AUTO: 11.6 FL (ref 9–12.9)
RBC # BLD AUTO: 4.4 M/UL (ref 4.2–5.4)
WBC # BLD AUTO: 3.9 K/UL (ref 4.8–10.8)

## 2020-01-21 PROCEDURE — 83036 HEMOGLOBIN GLYCOSYLATED A1C: CPT

## 2020-01-21 PROCEDURE — 36415 COLL VENOUS BLD VENIPUNCTURE: CPT

## 2020-01-21 PROCEDURE — 80053 COMPREHEN METABOLIC PANEL: CPT

## 2020-01-21 PROCEDURE — 85027 COMPLETE CBC AUTOMATED: CPT

## 2020-01-21 PROCEDURE — 80061 LIPID PANEL: CPT

## 2020-01-21 PROCEDURE — 84439 ASSAY OF FREE THYROXINE: CPT

## 2020-01-21 PROCEDURE — 84443 ASSAY THYROID STIM HORMONE: CPT

## 2020-01-21 PROCEDURE — 84480 ASSAY TRIIODOTHYRONINE (T3): CPT

## 2020-01-22 LAB
ALBUMIN SERPL BCP-MCNC: 4.2 G/DL (ref 3.2–4.9)
ALBUMIN/GLOB SERPL: 1.2 G/DL
ALP SERPL-CCNC: 44 U/L (ref 30–99)
ALT SERPL-CCNC: 27 U/L (ref 2–50)
ANION GAP SERPL CALC-SCNC: 6 MMOL/L (ref 0–11.9)
AST SERPL-CCNC: 21 U/L (ref 12–45)
BILIRUB SERPL-MCNC: 0.5 MG/DL (ref 0.1–1.5)
BUN SERPL-MCNC: 29 MG/DL (ref 8–22)
CALCIUM SERPL-MCNC: 9.7 MG/DL (ref 8.5–10.5)
CHLORIDE SERPL-SCNC: 106 MMOL/L (ref 96–112)
CHOLEST SERPL-MCNC: 210 MG/DL (ref 100–199)
CO2 SERPL-SCNC: 28 MMOL/L (ref 20–33)
CREAT SERPL-MCNC: 0.91 MG/DL (ref 0.5–1.4)
FASTING STATUS PATIENT QL REPORTED: NORMAL
GLOBULIN SER CALC-MCNC: 3.4 G/DL (ref 1.9–3.5)
GLUCOSE SERPL-MCNC: 88 MG/DL (ref 65–99)
HDLC SERPL-MCNC: 76 MG/DL
LDLC SERPL CALC-MCNC: 125 MG/DL
POTASSIUM SERPL-SCNC: 4.6 MMOL/L (ref 3.6–5.5)
PROT SERPL-MCNC: 7.6 G/DL (ref 6–8.2)
SODIUM SERPL-SCNC: 140 MMOL/L (ref 135–145)
T3 SERPL-MCNC: 99.2 NG/DL (ref 60–181)
T4 FREE SERPL-MCNC: 0.91 NG/DL (ref 0.53–1.43)
TRIGL SERPL-MCNC: 47 MG/DL (ref 0–149)
TSH SERPL DL<=0.005 MIU/L-ACNC: 3.75 UIU/ML (ref 0.38–5.33)

## 2020-02-03 ENCOUNTER — OFFICE VISIT (OUTPATIENT)
Dept: MEDICAL GROUP | Facility: PHYSICIAN GROUP | Age: 65
End: 2020-02-03
Payer: COMMERCIAL

## 2020-02-03 VITALS
HEIGHT: 70 IN | OXYGEN SATURATION: 95 % | HEART RATE: 66 BPM | BODY MASS INDEX: 25.28 KG/M2 | RESPIRATION RATE: 20 BRPM | DIASTOLIC BLOOD PRESSURE: 82 MMHG | WEIGHT: 176.6 LBS | SYSTOLIC BLOOD PRESSURE: 122 MMHG | TEMPERATURE: 97.3 F

## 2020-02-03 DIAGNOSIS — R00.2 PALPITATIONS: ICD-10-CM

## 2020-02-03 DIAGNOSIS — E03.4 HYPOTHYROIDISM DUE TO ACQUIRED ATROPHY OF THYROID: ICD-10-CM

## 2020-02-03 DIAGNOSIS — E74.39 GLUCOSE INTOLERANCE: ICD-10-CM

## 2020-02-03 PROCEDURE — 99214 OFFICE O/P EST MOD 30 MIN: CPT | Performed by: FAMILY MEDICINE

## 2020-02-03 PROCEDURE — 93000 ELECTROCARDIOGRAM COMPLETE: CPT | Performed by: FAMILY MEDICINE

## 2020-02-03 ASSESSMENT — ENCOUNTER SYMPTOMS
DEPRESSION: 0
CHEST PRESSURE: 0
NUMBNESS: 0
HEMOPTYSIS: 0
MUSCULOSKELETAL NEGATIVE: 1
COUGH: 0
BRUISES/BLEEDS EASILY: 0
SHORTNESS OF BREATH: 0
NAUSEA: 0
IRREGULAR HEARTBEAT: 1
CHILLS: 0
NERVOUS/ANXIOUS: 0
RESPIRATORY NEGATIVE: 1
PALPITATIONS: 1
WEAKNESS: 0
FEVER: 0
DIZZINESS: 0
EYES NEGATIVE: 1
BLURRED VISION: 0
SYNCOPE: 0
GASTROINTESTINAL NEGATIVE: 1
PSYCHIATRIC NEGATIVE: 1
CONSTITUTIONAL NEGATIVE: 1
HEADACHES: 0
MYALGIAS: 0
DOUBLE VISION: 0
HEARTBURN: 0
TINGLING: 0
NEAR-SYNCOPE: 0
VOMITING: 0
NEUROLOGICAL NEGATIVE: 1

## 2020-02-03 ASSESSMENT — PATIENT HEALTH QUESTIONNAIRE - PHQ9: CLINICAL INTERPRETATION OF PHQ2 SCORE: 0

## 2020-02-04 NOTE — PROGRESS NOTES
Subjective:      Shellie Dobbins is a 64 y.o. female who presents with Heart Problem (Fluttering in chest, notices it every once in awhile)            1. Palpitations  New onset with no obvious cause, ekg today with no arrhythmias   - EKG - Clinic Performed  - [facility] Patch Monitor; Future    2. Hypothyroidism due to acquired atrophy of thyroid  Patient is being treated for hypothyroidism, currently taking meds, no symptoms of fast or slow heartbeat and energy level steady. No new hair loss or skin symptoms. Labs have been checked in past year and are stable on current dose.  controlled      3. Glucose intolerance  Due to the patient's issues with glucose management, today they were extensively counseled on a low carb diet and exercise and losing weight      Past Medical History:  2/8/2010: Atopic dermatitis  9/1/2011: Baker's cyst of knee  1999: Breast augmentation      Comment:  Saline  3/12/2014: DDD (degenerative disc disease), cervical  2/8/2010: Familial hyperlipidemia  3/12/2014: H/O tear of meniscus of knee joint      Comment:  Right knee. Had surgery 2011. Now left knee pain. Felt                great last year, working out. Overdid it working out.                Increased pain to point very swollen, hard to bend and                straighten left leg starting last week. Has felt like                about to give way when walking today. Has MRI scheduled.                Feels just like the right knee felt before surgery. No                brace. Not helping due to swelling. Taking ibuprofen. 600               mg bid.   No date: High cholesterol  9/1/2011: Medial meniscus tear  9/14/2013: Menopause      Comment:  2007  19yo: Mononucleosis      Comment:  Mono induced Hepatitis  9/14/2013: CLAIR (obstructive sleep apnea)      Comment:  AHI 17  In 2009. Uses nightgard  6/18/2011: Osteopenia  1/4/2013: Soft tissue mass  2/8/2010: Vitamin d deficiency  Past Surgical History:  9/21/2016: MASS EXCISION ORTHO;  Bilateral      Comment:  Procedure: MASS EXCISION ORTHO - EXCISION POSTERIOR NECK               LIPOMA;  Surgeon: Troy Kapoor M.D.;  Location:                SURGERY [facility];  Service:   may 2014: MENISCECTOMY; Left      Comment:  Karlos - left knee  10/6/2011: KNEE ARTHROSCOPY      Comment:  Performed by ALEXANDRIA BRAR at SURGERY [facility]  10/6/2011: MEDIAL MENISCECTOMY      Comment:  Performed by ALEXANDRIA BRAR at SURGERY [facility]  2007: COLONOSCOPY  1999: MAMMOPLASTY AUGMENTATION      Comment:  saline  1997: TUBAL COAGULATION LAPAROSCOPIC BILATERAL  1984: PRIMARY C SECTION  No date: PB ENLARGE BREAST WITH IMPLANT  Social History    Tobacco Use      Smoking status: Never Smoker      Smokeless tobacco: Never Used    Alcohol use: Yes      Alcohol/week: 0.5 oz      Types: 1 Glasses of wine per week      Comment: 1-2 per week    Drug use: No    Review of patient's family history indicates:  Problem: Stroke      Relation: Mother          Age of Onset: 75          Comment: d 86  Problem: Heart Disease      Relation: Father          Age of Onset: (Not Specified)          Comment: d. 59, rheum heart dz  Problem: Hyperlipidemia      Relation: Father          Age of Onset: (Not Specified)  Problem: Cancer      Relation: Sister          Age of Onset: (Not Specified)          Comment: Melanoma  Problem: Heart Disease      Relation: Sister          Age of Onset: (Not Specified)          Comment: aneurysm on heart  Problem: Diabetes      Relation: Neg Hx          Age of Onset: (Not Specified)      Current Outpatient Medications: •  alendronate (FOSAMAX) 70 MG Tab, TAKE 1 TABLET WEEKLY ON AN EMPTY STOMACH WITH A GLASS OF WATER SIT UP ,NO FOOD OR MEDS FOR 30 MINUTES, Disp: 12 Tab, Rfl: 0•  atorvastatin (LIPITOR) 40 MG Tab, TAKE 1 TABLET BY MOUTH  EVERY NIGHT AT BEDTIME, Disp: 90 Tab, Rfl: 3•  levothyroxine (SYNTHROID) 50 MCG Tab, TAKE 1 TABLET BY MOUTH  ONCE DAILY IN THE MORNING ON AN  EMPTY STOMACH, Disp: 90 Tab, Rfl: 3•  meloxicam (MOBIC) 7.5 MG Tab, Take 1 Tab by mouth every day., Disp: 90 Tab, Rfl: 1•  levothyroxine (SYNTHROID) 50 MCG Tab, TAKE 1 TABLET BY MOUTH EVERY MORNING ON AN EMPTY STOMACH, Disp: 90 Tab, Rfl: 0•  sulfamethoxazole-trimethoprim (BACTRIM DS) 800-160 MG tablet, Take 1 Tab by mouth 2 times a day., Disp: 20 Tab, Rfl: 0•  Multiple Vitamins-Minerals (MULTIVITAMIN PO), Take  by mouth every day., Disp: , Rfl: •  Cholecalciferol (VITAMIN D3) 2000 UNIT Cap, Take  by mouth every day., Disp: , Rfl: •  Potassium Gluconate 595 MG Cap, Take  by mouth every day., Disp: , Rfl: •  Calcium Carbonate (CALCIUM 600 PO), Take  by mouth every day., Disp: , Rfl: •  triamcinolone acetonide (KENALOG) 0.1 % Ointment, Thin layer to clean dry skin twice daily as needed for rash., Disp: 60 g, Rfl: 4•  ibuprofen (MOTRIN) 200 MG TABS, Take 600 mg by mouth every 8 hours as needed. Indications: Mild to Moderate Pain, Disp: , Rfl:     Patient was instructed on the use of medications, either prescriptions or OTC and informed on when the appropriate follow up time period should be. In addition, patient was also instructed that should any acute worsening occur that they should notify this clinic asap or call 911.        Palpitations    This is a new problem. The current episode started more than 1 month ago. The problem occurs intermittently. The problem has been waxing and waning. On average, each episode lasts 1 minute. Nothing aggravates the symptoms. Associated symptoms include an irregular heartbeat. Pertinent negatives include no anxiety, chest fullness, chest pain, coughing, dizziness, fever, malaise/fatigue, nausea, near-syncope, numbness, shortness of breath, syncope, vomiting or weakness. She has tried bed rest for the symptoms. The treatment provided mild relief. There are no known risk factors.       Review of Systems   Constitutional: Negative.  Negative for chills,  "fever and malaise/fatigue.   HENT: Negative.  Negative for hearing loss.    Eyes: Negative.  Negative for blurred vision and double vision.   Respiratory: Negative.  Negative for cough, hemoptysis and shortness of breath.    Cardiovascular: Positive for palpitations. Negative for chest pain, syncope and near-syncope.   Gastrointestinal: Negative.  Negative for heartburn, nausea and vomiting.   Genitourinary: Negative.  Negative for dysuria.   Musculoskeletal: Negative.  Negative for myalgias.   Skin: Negative.  Negative for rash.   Neurological: Negative.  Negative for dizziness, tingling, weakness, numbness and headaches.   Endo/Heme/Allergies: Negative.  Does not bruise/bleed easily.   Psychiatric/Behavioral: Negative.  Negative for depression and suicidal ideas. The patient is not nervous/anxious.    All other systems reviewed and are negative.         Objective:     /82   Pulse 66   Temp 36.3 °C (97.3 °F) (Temporal)   Resp 20   Ht 1.778 m (5' 10\")   Wt 80.1 kg (176 lb 9.6 oz)   LMP 05/01/2008   SpO2 95%   BMI 25.34 kg/m²      Physical Exam  Vitals signs and nursing note reviewed.   Constitutional:       General: She is not in acute distress.     Appearance: She is well-developed. She is not diaphoretic.   HENT:      Head: Normocephalic and atraumatic.      Mouth/Throat:      Pharynx: No oropharyngeal exudate.   Eyes:      Pupils: Pupils are equal, round, and reactive to light.   Cardiovascular:      Rate and Rhythm: Normal rate and regular rhythm.      Heart sounds: Normal heart sounds. No murmur. No friction rub. No gallop.    Pulmonary:      Effort: Pulmonary effort is normal. No respiratory distress.      Breath sounds: Normal breath sounds. No wheezing or rales.   Chest:      Chest wall: No tenderness.   Neurological:      Mental Status: She is alert and oriented to person, place, and time.   Psychiatric:         Behavior: Behavior normal.         Thought Content: Thought content normal.        "  Judgment: Judgment normal.                 Assessment/Plan:       1. Palpitations    - EKG [facility] Clinic [facility]  [facility]; Future    2. Hypothyroidism due to acquired atrophy of thyroid      3. Glucose intolerance

## 2020-03-09 ENCOUNTER — TELEPHONE (OUTPATIENT)
Dept: CARDIOLOGY | Facility: MEDICAL CENTER | Age: 65
End: 2020-03-09

## 2020-03-09 ENCOUNTER — NON-PROVIDER VISIT (OUTPATIENT)
Dept: CARDIOLOGY | Facility: MEDICAL CENTER | Age: 65
End: 2020-03-09
Attending: FAMILY MEDICINE
Payer: COMMERCIAL

## 2020-03-09 DIAGNOSIS — R00.2 PALPITATIONS: ICD-10-CM

## 2020-03-09 DIAGNOSIS — I49.1 PAC (PREMATURE ATRIAL CONTRACTION): ICD-10-CM

## 2020-03-09 NOTE — TELEPHONE ENCOUNTER
Patient enrolled in the 14 day Zio patch program per Faisal Patel MD. (enrolled under [employer] ADD today)    >Currently pending EOS.

## 2020-03-30 PROCEDURE — 0298T PR EXT ECG > 48HR TO 21 DAY REVIEW AND INTERPRETATN: CPT | Performed by: INTERNAL MEDICINE

## 2020-03-30 PROCEDURE — 0296T PR EXT ECG > 48HR TO 21 DAY RCRD W/CONECT INTL RCRD: CPT | Performed by: INTERNAL MEDICINE

## 2020-04-07 ENCOUNTER — OFFICE VISIT (OUTPATIENT)
Dept: MEDICAL GROUP | Facility: PHYSICIAN GROUP | Age: 65
End: 2020-04-07
Payer: COMMERCIAL

## 2020-04-07 VITALS
OXYGEN SATURATION: 93 % | HEART RATE: 78 BPM | TEMPERATURE: 97.9 F | DIASTOLIC BLOOD PRESSURE: 72 MMHG | BODY MASS INDEX: 25.3 KG/M2 | RESPIRATION RATE: 16 BRPM | HEIGHT: 70 IN | SYSTOLIC BLOOD PRESSURE: 118 MMHG | WEIGHT: 176.7 LBS

## 2020-04-07 DIAGNOSIS — Z76.0 MEDICATION REFILL: ICD-10-CM

## 2020-04-07 DIAGNOSIS — I49.1 PAC (PREMATURE ATRIAL CONTRACTION): ICD-10-CM

## 2020-04-07 DIAGNOSIS — E03.4 HYPOTHYROIDISM DUE TO ACQUIRED ATROPHY OF THYROID: ICD-10-CM

## 2020-04-07 DIAGNOSIS — M81.8 OTHER OSTEOPOROSIS WITHOUT CURRENT PATHOLOGICAL FRACTURE: ICD-10-CM

## 2020-04-07 DIAGNOSIS — E78.49 FAMILIAL HYPERLIPIDEMIA: ICD-10-CM

## 2020-04-07 DIAGNOSIS — E55.9 VITAMIN D INSUFFICIENCY: ICD-10-CM

## 2020-04-07 PROCEDURE — 99214 OFFICE O/P EST MOD 30 MIN: CPT | Performed by: FAMILY MEDICINE

## 2020-04-07 RX ORDER — ALENDRONATE SODIUM 70 MG/1
TABLET ORAL
Qty: 12 TAB | Refills: 1 | Status: SHIPPED | OUTPATIENT
Start: 2020-04-07 | End: 2020-10-12

## 2020-04-07 RX ORDER — PROPRANOLOL HYDROCHLORIDE 10 MG/1
10 TABLET ORAL
Qty: 30 TAB | Refills: 1 | Status: SHIPPED | OUTPATIENT
Start: 2020-04-07 | End: 2022-02-01

## 2020-04-07 RX ORDER — ATORVASTATIN CALCIUM 40 MG/1
TABLET, FILM COATED ORAL
Qty: 90 TAB | Refills: 3 | Status: SHIPPED | OUTPATIENT
Start: 2020-04-07 | End: 2021-04-06

## 2020-04-07 RX ORDER — LEVOTHYROXINE SODIUM 0.05 MG/1
50 TABLET ORAL EVERY MORNING
Qty: 90 TAB | Refills: 1 | Status: SHIPPED | OUTPATIENT
Start: 2020-04-07 | End: 2020-12-04

## 2020-04-07 ASSESSMENT — FIBROSIS 4 INDEX: FIB4 SCORE: 1.27

## 2020-04-08 ASSESSMENT — ENCOUNTER SYMPTOMS
MYALGIAS: 0
DIZZINESS: 0
PALPITATIONS: 1
HEADACHES: 0
DEPRESSION: 0
BLURRED VISION: 0
NAUSEA: 0
COUGH: 0
RESPIRATORY NEGATIVE: 1
GASTROINTESTINAL NEGATIVE: 1
HEARTBURN: 0
BRUISES/BLEEDS EASILY: 0
FEVER: 0
CHILLS: 0
CONSTITUTIONAL NEGATIVE: 1
DOUBLE VISION: 0
HEMOPTYSIS: 0
MUSCULOSKELETAL NEGATIVE: 1
NEUROLOGICAL NEGATIVE: 1
PSYCHIATRIC NEGATIVE: 1
TINGLING: 0
EYES NEGATIVE: 1

## 2020-04-08 NOTE — PROGRESS NOTES
Subjective:      Shellie Dobbins is a 64 y.o. female who presents with Follow-Up (follow up on results) and Medication Refill (Alondronate, Lipitor, and Levothyroxine)            1. Familial hyperlipidemia  Currently treated for HLD, taking meds with no new myalgias or joint pain, watching fats in diet  controlled    - atorvastatin (LIPITOR) 40 MG Tab; TAKE 1 TABLET BY MOUTH  EVERY NIGHT AT BEDTIME  Dispense: 90 Tab; Refill: 3  - alendronate (FOSAMAX) 70 MG Tab; TAKE 1 TABLET WEEKLY ON AN EMPTY STOMACH WITH A GLASS OF WATER SIT UP ,NO FOOD OR MEDS FOR 30 MINUTES  Dispense: 12 Tab; Refill: 1    2. Medication refill  - alendronate (FOSAMAX) 70 MG Tab; TAKE 1 TABLET WEEKLY ON AN EMPTY STOMACH WITH A GLASS OF WATER SIT UP ,NO FOOD OR MEDS FOR 30 MINUTES  Dispense: 12 Tab; Refill: 1    3. Vitamin D insufficiency  Patient is currently treated for vitamin D deficiency with supplemental vitamin d, increase sunlight and dairy products. No current issues with treatment  controlled    - alendronate (FOSAMAX) 70 MG Tab; TAKE 1 TABLET WEEKLY ON AN EMPTY STOMACH WITH A GLASS OF WATER SIT UP ,NO FOOD OR MEDS FOR 30 MINUTES  Dispense: 12 Tab; Refill: 1    4. Hypothyroidism due to acquired atrophy of thyroid  Patient is being treated for hypothyroidism, currently taking meds, no symptoms of fast or slow heartbeat and energy level steady. No new hair loss or skin symptoms. Labs have been checked in past year and are stable on current dose.  controlled    - alendronate (FOSAMAX) 70 MG Tab; TAKE 1 TABLET WEEKLY ON AN EMPTY STOMACH WITH A GLASS OF WATER SIT UP ,NO FOOD OR MEDS FOR 30 MINUTES  Dispense: 12 Tab; Refill: 1    5. Other osteoporosis without current pathological fracture    - levothyroxine (SYNTHROID) 50 MCG Tab; Take 1 Tab by mouth every morning. ON A EMPTY STOMACH  Dispense: 90 Tab; Refill: 1    6. PAC (premature atrial contraction)  Went over results of ZIO patch, few symptomatic pac's present. Will use propranolol prn  for sx and keep sx diary  - propranolol (INDERAL) 10 MG Tab; Take 1 Tab by mouth 2 times daily with meals as needed (palpitations).  Dispense: 30 Tab; Refill: 1    Past Medical History:  2/8/2010: Atopic dermatitis  9/1/2011: Baker's cyst of knee  1999: Breast augmentation      Comment:  Saline  3/12/2014: DDD (degenerative disc disease), cervical  2/8/2010: Familial hyperlipidemia  3/12/2014: H/O tear of meniscus of knee joint      Comment:  Right knee. Had surgery 2011. Now left knee pain. Felt                great last year, working out. Overdid it working out.                Increased pain to point very swollen, hard to bend and                straighten left leg starting last week. Has felt like                about to give way when walking today. Has MRI scheduled.                Feels just like the right knee felt before surgery. No                brace. Not helping due to swelling. Taking ibuprofen. 600               mg bid.   No date: High cholesterol  9/1/2011: Medial meniscus tear  9/14/2013: Menopause      Comment:  2007  19yo: Mononucleosis      Comment:  Mono induced Hepatitis  9/14/2013: CLAIR (obstructive sleep apnea)      Comment:  AHI 17  In 2009. Uses nightgard  6/18/2011: Osteopenia  1/4/2013: Soft tissue mass  2/8/2010: Vitamin d deficiency  Past Surgical History:  9/21/2016: MASS EXCISION ORTHO; Bilateral      Comment:  Procedure: MASS EXCISION ORTHO - EXCISION POSTERIOR NECK               LIPOMA;  Surgeon: Troy Kapoor M.D.;  Location:                SURGERY [facility];  Service:   may 2014: MENISCECTOMY; Left      Comment:  Karlos - left knee  10/6/2011: KNEE ARTHROSCOPY      Comment:  Performed by ALEXANDRIA BRAR at SURGERY [facility]  10/6/2011: MEDIAL MENISCECTOMY      Comment:  Performed by ALEXANDRIA BARR at [facility]  2007: COLONOSCOPY  1999: MAMMOPLASTY AUGMENTATION      Comment:  saline  1997: TUBAL  COAGULATION LAPAROSCOPIC BILATERAL  1984: PRIMARY C SECTION  No date: PB ENLARGE BREAST WITH IMPLANT  Social History    Tobacco Use      Smoking status: Never Smoker      Smokeless tobacco: Never Used    Alcohol use: Yes      Alcohol/week: 0.5 oz      Types: 1 Glasses of wine per week      Comment: 1-2 per week    Drug use: No    Review of patient's family history indicates:  Problem: Stroke      Relation: Mother          Age of Onset: 75          Comment: d 86  Problem: Heart Disease      Relation: Father          Age of Onset: (Not Specified)          Comment: d. 59, rheum heart dz  Problem: Hyperlipidemia      Relation: Father          Age of Onset: (Not Specified)  Problem: Cancer      Relation: Sister          Age of Onset: (Not Specified)          Comment: Melanoma  Problem: Heart Disease      Relation: Sister          Age of Onset: (Not Specified)          Comment: aneurysm on heart  Problem: Diabetes      Relation: Neg Hx          Age of Onset: (Not Specified)      Current Outpatient Medications: •  atorvastatin (LIPITOR) 40 MG Tab, TAKE 1 TABLET BY MOUTH  EVERY NIGHT AT BEDTIME, Disp: 90 Tab, Rfl: 3•  alendronate (FOSAMAX) 70 MG Tab, TAKE 1 TABLET WEEKLY ON AN EMPTY STOMACH WITH A GLASS OF WATER SIT UP ,NO FOOD OR MEDS FOR 30 MINUTES, Disp: 12 Tab, Rfl: 1•  levothyroxine (SYNTHROID) 50 MCG Tab, Take 1 Tab by mouth every morning. ON A EMPTY STOMACH, Disp: 90 Tab, Rfl: 1•  propranolol (INDERAL) 10 MG Tab, Take 1 Tab by mouth 2 times daily with meals as needed (palpitations)., Disp: 30 Tab, Rfl: 1•  Multiple Vitamins-Minerals (MULTIVITAMIN PO), Take  by mouth every day., Disp: , Rfl: •  Cholecalciferol (VITAMIN D3) 2000 UNIT Cap, Take  by mouth every day., Disp: , Rfl: •  Calcium Carbonate (CALCIUM 600 PO), Take  by mouth every day., Disp: , Rfl: •  triamcinolone acetonide (KENALOG) 0.1 % Ointment, Thin layer to clean dry skin twice daily as needed for rash., Disp: 60 g, Rfl: 4•  ibuprofen (MOTRIN) 200 MG  "TABS, Take 600 mg by mouth every 8 hours as needed. Indications: Mild to Moderate Pain, Disp: , Rfl: •  levothyroxine (SYNTHROID) 50 MCG Tab, TAKE 1 TABLET BY MOUTH ONCE DAILY IN THE MORNING ON AN  EMPTY STOMACH (Patient not taking: Reported on 4/7/2020), Disp: 90 Tab, Rfl: 3•  meloxicam (MOBIC) 7.5 MG Tab, Take 1 Tab by mouth every day. (Patient not taking: Reported on 4/7/2020), Disp: 90 Tab, Rfl: 1•  sulfamethoxazole-trimethoprim (BACTRIM DS) 800-160 MG tablet, Take 1 Tab by mouth 2 times a day. (Patient not taking: Reported on 4/7/2020), Disp: 20 Tab, Rfl: 0•  Potassium Gluconate 595 MG Cap, Take  by mouth every day., Disp: , Rfl:     Patient was instructed on the use of medications, either prescriptions or OTC and informed on when the appropriate follow up time period should be. In addition, patient was also instructed that should any acute worsening occur that they should notify this clinic asap or call 911.          Review of Systems   Constitutional: Negative.  Negative for chills and fever.   HENT: Negative.  Negative for hearing loss.    Eyes: Negative.  Negative for blurred vision and double vision.   Respiratory: Negative.  Negative for cough and hemoptysis.    Cardiovascular: Positive for palpitations. Negative for chest pain.   Gastrointestinal: Negative.  Negative for heartburn and nausea.   Genitourinary: Negative.  Negative for dysuria.   Musculoskeletal: Negative.  Negative for myalgias.   Skin: Negative.  Negative for rash.   Neurological: Negative.  Negative for dizziness, tingling and headaches.   Endo/Heme/Allergies: Negative.  Does not bruise/bleed easily.   Psychiatric/Behavioral: Negative.  Negative for depression and suicidal ideas.   All other systems reviewed and are negative.         Objective:     /72 (BP Location: Left arm, Patient Position: Sitting)   Pulse 78   Temp 36.6 °C (97.9 °F) (Tympanic)   Resp 16   Ht 1.778 m (5' 10\")   Wt 80.2 kg (176 lb 11.2 oz)   LMP 05/01/2008 "   SpO2 93%   BMI 25.35 kg/m²      Physical Exam  Vitals signs and nursing note reviewed.   Constitutional:       General: She is not in acute distress.     Appearance: She is well-developed. She is not diaphoretic.   HENT:      Head: Normocephalic and atraumatic.      Mouth/Throat:      Pharynx: No oropharyngeal exudate.   Eyes:      Pupils: Pupils are equal, round, and reactive to light.   Cardiovascular:      Rate and Rhythm: Normal rate and regular rhythm.      Heart sounds: Normal heart sounds. No murmur. No friction rub. No gallop.    Pulmonary:      Effort: Pulmonary effort is normal. No respiratory distress.      Breath sounds: Normal breath sounds. No wheezing or rales.   Chest:      Chest wall: No tenderness.   Neurological:      Mental Status: She is alert and oriented to person, place, and time.   Psychiatric:         Behavior: Behavior normal.         Thought Content: Thought content normal.         Judgment: Judgment normal.                 Assessment/Plan:       1. Familial hyperlipidemia    - atorvastatin (LIPITOR) 40 MG Tab; TAKE 1 TABLET BY MOUTH  EVERY NIGHT AT BEDTIME  Dispense: 90 Tab; Refill: 3  - alendronate (FOSAMAX) 70 MG Tab; TAKE 1 TABLET WEEKLY ON AN EMPTY STOMACH WITH A GLASS OF WATER SIT UP ,NO FOOD OR MEDS FOR 30 MINUTES  Dispense: 12 Tab; Refill: 1    2. Medication refill    - alendronate (FOSAMAX) 70 MG Tab; TAKE 1 TABLET WEEKLY ON AN EMPTY STOMACH WITH A GLASS OF WATER SIT UP ,NO FOOD OR MEDS FOR 30 MINUTES  Dispense: 12 Tab; Refill: 1    3. Vitamin D insufficiency    - alendronate (FOSAMAX) 70 MG Tab; TAKE 1 TABLET WEEKLY ON AN EMPTY STOMACH WITH A GLASS OF WATER SIT UP ,NO FOOD OR MEDS FOR 30 MINUTES  Dispense: 12 Tab; Refill: 1    4. Hypothyroidism due to acquired atrophy of thyroid    - alendronate (FOSAMAX) 70 MG Tab; TAKE 1 TABLET WEEKLY ON AN EMPTY STOMACH WITH A GLASS OF WATER SIT UP ,NO FOOD OR MEDS FOR 30 MINUTES  Dispense: 12 Tab; Refill: 1    5. Other osteoporosis  without current pathological fracture    - levothyroxine (SYNTHROID) 50 MCG Tab; Take 1 Tab by mouth every morning. ON A EMPTY STOMACH  Dispense: 90 Tab; Refill: 1    6. PAC (premature atrial contraction)  - propranolol (INDERAL) 10 MG Tab; Take 1 Tab by mouth 2 times daily with meals as needed (palpitations).  Dispense: 30 Tab; Refill: 1

## 2020-07-01 ENCOUNTER — HOSPITAL ENCOUNTER (OUTPATIENT)
Dept: RADIOLOGY | Facility: MEDICAL CENTER | Age: 65
End: 2020-07-01
Attending: FAMILY MEDICINE
Payer: COMMERCIAL

## 2020-07-01 DIAGNOSIS — Z12.31 VISIT FOR SCREENING MAMMOGRAM: ICD-10-CM

## 2020-07-01 PROCEDURE — 77067 SCR MAMMO BI INCL CAD: CPT

## 2020-10-11 DIAGNOSIS — E55.9 VITAMIN D INSUFFICIENCY: ICD-10-CM

## 2020-10-11 DIAGNOSIS — Z76.0 MEDICATION REFILL: ICD-10-CM

## 2020-10-11 DIAGNOSIS — E78.49 FAMILIAL HYPERLIPIDEMIA: ICD-10-CM

## 2020-10-11 DIAGNOSIS — E03.4 HYPOTHYROIDISM DUE TO ACQUIRED ATROPHY OF THYROID: ICD-10-CM

## 2020-10-12 RX ORDER — ALENDRONATE SODIUM 70 MG/1
TABLET ORAL
Qty: 12 TAB | Refills: 1 | Status: SHIPPED | OUTPATIENT
Start: 2020-10-12 | End: 2021-04-06

## 2020-11-06 ENCOUNTER — OFFICE VISIT (OUTPATIENT)
Dept: URGENT CARE | Facility: CLINIC | Age: 65
End: 2020-11-06
Payer: COMMERCIAL

## 2020-11-06 ENCOUNTER — HOSPITAL ENCOUNTER (OUTPATIENT)
Facility: MEDICAL CENTER | Age: 65
End: 2020-11-06
Attending: NURSE PRACTITIONER
Payer: COMMERCIAL

## 2020-11-06 VITALS
DIASTOLIC BLOOD PRESSURE: 70 MMHG | TEMPERATURE: 97.2 F | HEART RATE: 91 BPM | BODY MASS INDEX: 25.35 KG/M2 | RESPIRATION RATE: 16 BRPM | HEIGHT: 70 IN | SYSTOLIC BLOOD PRESSURE: 120 MMHG | OXYGEN SATURATION: 94 % | WEIGHT: 177.1 LBS

## 2020-11-06 DIAGNOSIS — J02.9 SORE THROAT: ICD-10-CM

## 2020-11-06 DIAGNOSIS — R06.7 SNEEZING: ICD-10-CM

## 2020-11-06 DIAGNOSIS — J30.2 OTHER SEASONAL ALLERGIC RHINITIS: ICD-10-CM

## 2020-11-06 PROCEDURE — 99213 OFFICE O/P EST LOW 20 MIN: CPT | Performed by: NURSE PRACTITIONER

## 2020-11-06 PROCEDURE — U0003 INFECTIOUS AGENT DETECTION BY NUCLEIC ACID (DNA OR RNA); SEVERE ACUTE RESPIRATORY SYNDROME CORONAVIRUS 2 (SARS-COV-2) (CORONAVIRUS DISEASE [COVID-19]), AMPLIFIED PROBE TECHNIQUE, MAKING USE OF HIGH THROUGHPUT TECHNOLOGIES AS DESCRIBED BY CMS-2020-01-R: HCPCS

## 2020-11-06 ASSESSMENT — ENCOUNTER SYMPTOMS
MYALGIAS: 0
SORE THROAT: 0
VOMITING: 0
WEIGHT LOSS: 0
ABDOMINAL PAIN: 0
ORTHOPNEA: 0
SINUS PAIN: 0
WEAKNESS: 0
CHILLS: 0
SHORTNESS OF BREATH: 0
FEVER: 0
HEADACHES: 0
NAUSEA: 0
COUGH: 0

## 2020-11-06 ASSESSMENT — FIBROSIS 4 INDEX: FIB4 SCORE: 1.27

## 2020-11-06 NOTE — PROGRESS NOTES
Subjective:   Shellie Dobbins is a 64 y.o. female who presents for Sore Throat (runny nose, sneezing x last night )       Other  This is a new problem. The current episode started yesterday. The problem occurs intermittently. The problem has been waxing and waning. Pertinent negatives include no abdominal pain, chest pain, chills, congestion, coughing, fever, headaches, myalgias, nausea, sore throat, vomiting or weakness. Associated symptoms comments: none. Nothing aggravates the symptoms. She has tried nothing for the symptoms.     Pt presents for evaluation of a new problem, reports clear runny nose, sore throat, and sneezing.  States she has environmental allergies and takes Flonase on a daily basis, however with increased when we will also she has increased allergy-like symptoms.  However, due to current pandemic, patient is required to have a Covid test to return to work.    Review of Systems   Constitutional: Negative for chills, fever, malaise/fatigue and weight loss.   HENT: Negative for congestion, sinus pain and sore throat.    Respiratory: Negative for cough and shortness of breath.    Cardiovascular: Negative for chest pain, orthopnea and leg swelling.   Gastrointestinal: Negative for abdominal pain, nausea and vomiting.   Genitourinary: Negative for dysuria.   Musculoskeletal: Negative for myalgias.   Neurological: Negative for weakness and headaches.   All other systems reviewed and are negative.      MEDS:   Current Outpatient Medications:   •  alendronate (FOSAMAX) 70 MG Tab, TAKE 1 TABLET WEEKLY ON AN EMPTY STOMACH WITH A GLASS OF WATER SIT UP ,NO FOOD OR MEDS FOR 30 MINUTES, Disp: 12 Tab, Rfl: 1  •  atorvastatin (LIPITOR) 40 MG Tab, TAKE 1 TABLET BY MOUTH  EVERY NIGHT AT BEDTIME, Disp: 90 Tab, Rfl: 3  •  levothyroxine (SYNTHROID) 50 MCG Tab, Take 1 Tab by mouth every morning. ON A EMPTY STOMACH, Disp: 90 Tab, Rfl: 1  •  Multiple Vitamins-Minerals (MULTIVITAMIN PO), Take  by mouth every day.,  "Disp: , Rfl:   •  Cholecalciferol (VITAMIN D3) 2000 UNIT Cap, Take  by mouth every day., Disp: , Rfl:   •  Potassium Gluconate 595 MG Cap, Take  by mouth every day., Disp: , Rfl:   •  Calcium Carbonate (CALCIUM 600 PO), Take  by mouth every day., Disp: , Rfl:   •  ibuprofen (MOTRIN) 200 MG TABS, Take 600 mg by mouth every 8 hours as needed. Indications: Mild to Moderate Pain, Disp: , Rfl:   •  propranolol (INDERAL) 10 MG Tab, Take 1 Tab by mouth 2 times daily with meals as needed (palpitations). (Patient not taking: Reported on 11/6/2020), Disp: 30 Tab, Rfl: 1  •  levothyroxine (SYNTHROID) 50 MCG Tab, TAKE 1 TABLET BY MOUTH ONCE DAILY IN THE MORNING ON AN  EMPTY STOMACH (Patient not taking: Reported on 4/7/2020), Disp: 90 Tab, Rfl: 3  •  meloxicam (MOBIC) 7.5 MG Tab, Take 1 Tab by mouth every day. (Patient not taking: Reported on 4/7/2020), Disp: 90 Tab, Rfl: 1  •  sulfamethoxazole-trimethoprim (BACTRIM DS) 800-160 MG tablet, Take 1 Tab by mouth 2 times a day. (Patient not taking: Reported on 4/7/2020), Disp: 20 Tab, Rfl: 0  •  triamcinolone acetonide (KENALOG) 0.1 % Ointment, Thin layer to clean dry skin twice daily as needed for rash. (Patient not taking: Reported on 11/6/2020), Disp: 60 g, Rfl: 4  ALLERGIES: No Known Allergies    Patient's PMH, SocHx, SurgHx, FamHx, Drug allergies and medications were reviewed.     Objective:   /70 (BP Location: Left arm, Patient Position: Sitting)   Pulse 91   Temp 36.2 °C (97.2 °F) (Temporal)   Resp 16   Ht 1.778 m (5' 10\")   Wt 80.3 kg (177 lb 1.6 oz)   LMP 05/01/2008   SpO2 94%   BMI 25.41 kg/m²     Physical Exam  Vitals signs and nursing note reviewed.   Constitutional:       General: She is awake.      Appearance: Normal appearance. She is well-developed and normal weight.   HENT:      Head: Normocephalic and atraumatic.      Right Ear: Hearing, tympanic membrane, ear canal and external ear normal.      Left Ear: Hearing, tympanic membrane, ear canal and " external ear normal.      Nose: Nose normal.      Mouth/Throat:      Mouth: Mucous membranes are moist.      Pharynx: Oropharynx is clear.   Eyes:      Extraocular Movements: Extraocular movements intact.      Conjunctiva/sclera: Conjunctivae normal.   Neck:      Musculoskeletal: Normal range of motion and neck supple.   Cardiovascular:      Rate and Rhythm: Normal rate and regular rhythm.   Pulmonary:      Effort: Pulmonary effort is normal.      Breath sounds: Normal breath sounds.   Musculoskeletal: Normal range of motion.   Skin:     General: Skin is warm and dry.   Neurological:      Mental Status: She is alert and oriented to person, place, and time.   Psychiatric:         Mood and Affect: Mood normal.         Behavior: Behavior normal.         Thought Content: Thought content normal.         Assessment/Plan:   Assessment    1. Sore throat  - COVID/SARS COV-2 PCR; Future    2. Other seasonal allergic rhinitis  - COVID/SARS COV-2 PCR; Future    3. Sneezing  - COVID/SARS COV-2 PCR; Future    Will obtain COVID testing.  Advised to home isolate until test results return.  Supportive care options also discussed, to include alternating Tylenol and Advil as well as use of ice, heat, and massage.  Differential diagnosis, natural history, and indications for immediate follow-up were discussed.     Return to urgent care clinic or PCP if current symptoms do not improve and/or worsening symptoms occur. Advised of signs and symptoms which would warrant further evaluation and /or emergent evaluation in ER.  All questions answered and the patient agrees to the plan of care.       Please note that this dictation was created using voice recognition software. I have made every reasonable attempt to correct obvious errors, but I expect that there may be errors of grammar and possibly content that I did not discover before finalizing the note.

## 2020-11-07 LAB — COVID ORDER STATUS COVID19: NORMAL

## 2020-11-08 LAB
SARS-COV-2 RNA RESP QL NAA+PROBE: NOTDETECTED
SPECIMEN SOURCE: NORMAL

## 2020-12-01 ENCOUNTER — OFFICE VISIT (OUTPATIENT)
Dept: MEDICAL GROUP | Facility: PHYSICIAN GROUP | Age: 65
End: 2020-12-01
Payer: COMMERCIAL

## 2020-12-01 VITALS
HEIGHT: 69 IN | OXYGEN SATURATION: 95 % | BODY MASS INDEX: 25.17 KG/M2 | WEIGHT: 169.9 LBS | SYSTOLIC BLOOD PRESSURE: 110 MMHG | TEMPERATURE: 97.6 F | HEART RATE: 80 BPM | DIASTOLIC BLOOD PRESSURE: 76 MMHG | RESPIRATION RATE: 20 BRPM

## 2020-12-01 DIAGNOSIS — Z12.11 SCREENING FOR COLORECTAL CANCER: ICD-10-CM

## 2020-12-01 DIAGNOSIS — Z78.0 POST-MENOPAUSAL: ICD-10-CM

## 2020-12-01 DIAGNOSIS — E74.39 GLUCOSE INTOLERANCE: ICD-10-CM

## 2020-12-01 DIAGNOSIS — Z00.00 ANNUAL PHYSICAL EXAM: ICD-10-CM

## 2020-12-01 DIAGNOSIS — E78.49 FAMILIAL HYPERLIPIDEMIA: ICD-10-CM

## 2020-12-01 DIAGNOSIS — E03.4 HYPOTHYROIDISM DUE TO ACQUIRED ATROPHY OF THYROID: ICD-10-CM

## 2020-12-01 DIAGNOSIS — Z12.12 SCREENING FOR COLORECTAL CANCER: ICD-10-CM

## 2020-12-01 DIAGNOSIS — E55.9 VITAMIN D INSUFFICIENCY: ICD-10-CM

## 2020-12-01 PROCEDURE — 99396 PREV VISIT EST AGE 40-64: CPT | Performed by: FAMILY MEDICINE

## 2020-12-01 ASSESSMENT — ENCOUNTER SYMPTOMS
HEARTBURN: 0
MUSCULOSKELETAL NEGATIVE: 1
CHILLS: 0
DIZZINESS: 0
MYALGIAS: 0
COUGH: 0
HEADACHES: 0
TINGLING: 0
GASTROINTESTINAL NEGATIVE: 1
CARDIOVASCULAR NEGATIVE: 1
BRUISES/BLEEDS EASILY: 0
PSYCHIATRIC NEGATIVE: 1
RESPIRATORY NEGATIVE: 1
EYES NEGATIVE: 1
NAUSEA: 0
DEPRESSION: 0
DOUBLE VISION: 0
PALPITATIONS: 0
HEMOPTYSIS: 0
CONSTITUTIONAL NEGATIVE: 1
BLURRED VISION: 0
FEVER: 0
NEUROLOGICAL NEGATIVE: 1

## 2020-12-01 ASSESSMENT — FIBROSIS 4 INDEX: FIB4 SCORE: 1.29

## 2020-12-01 NOTE — PROGRESS NOTES
Subjective:      Shellie Dobbins is a 65 y.o. female who presents with Follow-Up (Wants labs )            1. Annual physical exam  This patient was here for a preventative medicine visit today. The Health Maintenance issues that are appropriate for this patient's age and sex were discussed and any that they agreed to were ordered. The patient was also give age and sex appropriate counseling for preventative matters such as diet, exercise, medication usage, and social determinants.      2. Vitamin D insufficiency  Patient is currently treated for vitamin D deficiency with supplemental vitamin d, increase sunlight and dairy products. No current issues with treatment  controlled    - Comp Metabolic Panel; Future  - FREE THYROXINE; Future  - Lipid Profile; Future  - TRIIDOTHYRONINE; Future  - TSH; Future  - VITAMIN D,25 HYDROXY; Future  - CBC WITHOUT DIFFERENTIAL; Future  - HEMOGLOBIN A1C; Future    3. Familial hyperlipidemia  Currently treated for HLD, taking meds with no new myalgias or joint pain, watching fats in diet  controlled    - Comp Metabolic Panel; Future  - FREE THYROXINE; Future  - Lipid Profile; Future  - TRIIDOTHYRONINE; Future  - TSH; Future  - VITAMIN D,25 HYDROXY; Future  - CBC WITHOUT DIFFERENTIAL; Future  - HEMOGLOBIN A1C; Future    4. Glucose intolerance  Due to the patient's issues with glucose management, today they were extensively counseled on a low carb diet and exercise and losing weight    - Comp Metabolic Panel; Future  - FREE THYROXINE; Future  - Lipid Profile; Future  - TRIIDOTHYRONINE; Future  - TSH; Future  - VITAMIN D,25 HYDROXY; Future  - CBC WITHOUT DIFFERENTIAL; Future  - HEMOGLOBIN A1C; Future    5. Hypothyroidism due to acquired atrophy of thyroid  Patient is being treated for hypothyroidism, currently taking meds, no symptoms of fast or slow heartbeat and energy level steady. No new hair loss or skin symptoms. Labs have been checked in past year and are stable on current  dose.  controlled    - Comp Metabolic Panel; Future  - FREE THYROXINE; Future  - Lipid Profile; Future  - TRIIDOTHYRONINE; Future  - TSH; Future  - VITAMIN D,25 HYDROXY; Future  - CBC WITHOUT DIFFERENTIAL; Future  - HEMOGLOBIN A1C; Future    6. Screening for colorectal cancer    - OCCULT BLOOD FECES IMMUNOASSAY (FIT); Future    7. Post-menopausal    - DS-BONE DENSITY STUDY (DEXA)    Past Medical History:  2/8/2010: Atopic dermatitis  9/1/2011: Baker's cyst of knee  1999: Breast augmentation      Comment:  Saline  3/12/2014: DDD (degenerative disc disease), cervical  2/8/2010: Familial hyperlipidemia  3/12/2014: H/O tear of meniscus of knee joint      Comment:  Right knee. Had surgery 2011. Now left knee pain. Felt                great last year, working out. Overdid it working out.                Increased pain to point very swollen, hard to bend and                straighten left leg starting last week. Has felt like                about to give way when walking today. Has MRI scheduled.                Feels just like the right knee felt before surgery. No                brace. Not helping due to swelling. Taking ibuprofen. 600               mg bid.   No date: High cholesterol  9/1/2011: Medial meniscus tear  9/14/2013: Menopause      Comment:  2007  19yo: Mononucleosis      Comment:  Mono induced Hepatitis  9/14/2013: CLAIR (obstructive sleep apnea)      Comment:  AHI 17  In 2009. Uses nightgard  6/18/2011: Osteopenia  1/4/2013: Soft tissue mass  2/8/2010: Vitamin d deficiency  Past Surgical History:  9/21/2016: MASS EXCISION ORTHO; Bilateral      Comment:  Procedure: MASS EXCISION ORTHO - EXCISION POSTERIOR NECK               LIPOMA;  Surgeon: Troy Kapoor M.D.;  Location:                SURGERY [facility];  Service:   may 2014: MENISCECTOMY; Left      Comment:  Karlos - left knee  10/6/2011: KNEE ARTHROSCOPY      Comment:  Performed by ALEXANDRIA BRAR at [facility]  ORS  10/6/2011: MEDIAL MENISCECTOMY      Comment:  Performed by ALEXANDRIA BRAR at SURGERY [facility] ORS  2007: COLONOSCOPY  1999: MAMMOPLASTY AUGMENTATION      Comment:  saline  1997: TUBAL COAGULATION LAPAROSCOPIC BILATERAL  1984: PRIMARY C SECTION  No date: PB ENLARGE BREAST WITH IMPLANT  Social History    Tobacco Use      Smoking status: Never Smoker      Smokeless tobacco: Never Used    Alcohol use: Yes      Alcohol/week: 0.5 oz      Types: 1 Glasses of wine per week      Comment: 1-2 per week    Drug use: No    Review of patient's family history indicates:  Problem: Stroke      Relation: Mother          Age of Onset: 75          Comment: d 86  Problem: Heart Disease      Relation: Father          Age of Onset: (Not Specified)          Comment: d. 59, rheum heart dz  Problem: Hyperlipidemia      Relation: Father          Age of Onset: (Not Specified)  Problem: Cancer      Relation: Sister          Age of Onset: (Not Specified)          Comment: Melanoma  Problem: Heart Disease      Relation: Sister          Age of Onset: (Not Specified)          Comment: aneurysm on heart  Problem: Diabetes      Relation: Neg Hx          Age of Onset: (Not Specified)      Current Outpatient Medications: •  alendronate (FOSAMAX) 70 MG Tab, TAKE 1 TABLET WEEKLY ON AN EMPTY STOMACH WITH A GLASS OF WATER SIT UP ,NO FOOD OR MEDS FOR 30 MINUTES, Disp: 12 Tab, Rfl: 1•  atorvastatin (LIPITOR) 40 MG Tab, TAKE 1 TABLET BY MOUTH  EVERY NIGHT AT BEDTIME, Disp: 90 Tab, Rfl: 3•  levothyroxine (SYNTHROID) 50 MCG Tab, Take 1 Tab by mouth every morning. ON A EMPTY STOMACH, Disp: 90 Tab, Rfl: 1•  Multiple Vitamins-Minerals (MULTIVITAMIN PO), Take  by mouth every day., Disp: , Rfl: •  Cholecalciferol (VITAMIN D3) 2000 UNIT Cap, Take  by mouth every day., Disp: , Rfl: •  Calcium Carbonate (CALCIUM 600 PO), Take  by mouth every day., Disp: , Rfl: •  ibuprofen (MOTRIN) 200 MG TABS, Take 600 mg by mouth every 8 hours as  needed. Indications: Mild to Moderate Pain, Disp: , Rfl: •  propranolol (INDERAL) 10 MG Tab, Take 1 Tab by mouth 2 times daily with meals as needed (palpitations). (Patient not taking: Reported on 11/6/2020), Disp: 30 Tab, Rfl: 1•  levothyroxine (SYNTHROID) 50 MCG Tab, TAKE 1 TABLET BY MOUTH ONCE DAILY IN THE MORNING ON AN  EMPTY STOMACH (Patient not taking: Reported on 4/7/2020), Disp: 90 Tab, Rfl: 3•  meloxicam (MOBIC) 7.5 MG Tab, Take 1 Tab by mouth every day. (Patient not taking: Reported on 4/7/2020), Disp: 90 Tab, Rfl: 1•  sulfamethoxazole-trimethoprim (BACTRIM DS) 800-160 MG tablet, Take 1 Tab by mouth 2 times a day. (Patient not taking: Reported on 4/7/2020), Disp: 20 Tab, Rfl: 0•  Potassium Gluconate 595 MG Cap, Take  by mouth every day., Disp: , Rfl: •  triamcinolone acetonide (KENALOG) 0.1 % Ointment, Thin layer to clean dry skin twice daily as needed for rash. (Patient not taking: Reported on 11/6/2020), Disp: 60 g, Rfl: 4    Patient was instructed on the use of medications, either prescriptions or OTC and informed on when the appropriate follow up time period should be. In addition, patient was also instructed that should any acute worsening occur that they should notify this clinic asap or call 911.          Review of Systems   Constitutional: Negative.  Negative for chills and fever.   HENT: Negative.  Negative for hearing loss.    Eyes: Negative.  Negative for blurred vision and double vision.   Respiratory: Negative.  Negative for cough and hemoptysis.    Cardiovascular: Negative.  Negative for chest pain and palpitations.   Gastrointestinal: Negative.  Negative for heartburn and nausea.   Genitourinary: Negative.  Negative for dysuria.   Musculoskeletal: Negative.  Negative for myalgias.   Skin: Negative.  Negative for rash.   Neurological: Negative.  Negative for dizziness, tingling and headaches.   Endo/Heme/Allergies: Negative.  Does not bruise/bleed easily.   Psychiatric/Behavioral: Negative.   "Negative for depression and suicidal ideas.   All other systems reviewed and are negative.         Objective:     /76 (BP Location: Left arm, Patient Position: Sitting, BP Cuff Size: Adult)   Pulse 80   Temp 36.4 °C (97.6 °F) (Temporal)   Resp 20   Ht 1.753 m (5' 9\")   Wt 77.1 kg (169 lb 14.4 oz)   LMP 05/01/2008   SpO2 95%   BMI 25.09 kg/m²      Physical Exam  Vitals signs and nursing note reviewed.   Constitutional:       General: She is not in acute distress.     Appearance: She is well-developed. She is not diaphoretic.   HENT:      Head: Normocephalic and atraumatic.      Mouth/Throat:      Pharynx: No oropharyngeal exudate.   Eyes:      Pupils: Pupils are equal, round, and reactive to light.   Cardiovascular:      Rate and Rhythm: Normal rate and regular rhythm.      Heart sounds: Normal heart sounds. No murmur. No friction rub. No gallop.    Pulmonary:      Effort: Pulmonary effort is normal. No respiratory distress.      Breath sounds: Normal breath sounds. No wheezing or rales.   Chest:      Chest wall: No tenderness.   Neurological:      Mental Status: She is alert and oriented to person, place, and time.   Psychiatric:         Behavior: Behavior normal.         Thought Content: Thought content normal.         Judgment: Judgment normal.                 Assessment/Plan:        1. Annual physical exam      2. Vitamin D insufficiency    - Comp Metabolic Panel; Future  - FREE THYROXINE; Future  - Lipid Profile; Future  - TRIIDOTHYRONINE; Future  - TSH; Future  - VITAMIN D,25 HYDROXY; Future  - CBC WITHOUT DIFFERENTIAL; Future  - HEMOGLOBIN A1C; Future    3. Familial hyperlipidemia    - Comp Metabolic Panel; Future  - FREE THYROXINE; Future  - Lipid Profile; Future  - TRIIDOTHYRONINE; Future  - TSH; Future  - VITAMIN D,25 HYDROXY; Future  - CBC WITHOUT DIFFERENTIAL; Future  - HEMOGLOBIN A1C; Future    4. Glucose intolerance    - Comp Metabolic Panel; Future  - FREE THYROXINE; Future  - Lipid " Profile; Future  - TRIIDOTHYRONINE; Future  - TSH; Future  - VITAMIN D,25 HYDROXY; Future  - CBC WITHOUT DIFFERENTIAL; Future  - HEMOGLOBIN A1C; Future    5. Hypothyroidism due to acquired atrophy of thyroid    - Comp Metabolic Panel; Future  - FREE THYROXINE; Future  - Lipid Profile; Future  - TRIIDOTHYRONINE; Future  - TSH; Future  - VITAMIN D,25 HYDROXY; Future  - CBC WITHOUT DIFFERENTIAL; Future  - HEMOGLOBIN A1C; Future    6. Screening for colorectal cancer    - OCCULT BLOOD FECES IMMUNOASSAY (FIT); Future    7. Post-menopausal    - DS-BONE DENSITY STUDY (DEXA)

## 2020-12-04 DIAGNOSIS — M81.8 OTHER OSTEOPOROSIS WITHOUT CURRENT PATHOLOGICAL FRACTURE: ICD-10-CM

## 2020-12-04 RX ORDER — LEVOTHYROXINE SODIUM 0.05 MG/1
50 TABLET ORAL EVERY MORNING
Qty: 90 TAB | Refills: 1 | Status: SHIPPED | OUTPATIENT
Start: 2020-12-04 | End: 2021-04-06

## 2020-12-18 ENCOUNTER — HOSPITAL ENCOUNTER (OUTPATIENT)
Dept: RADIOLOGY | Facility: MEDICAL CENTER | Age: 65
End: 2020-12-18
Attending: FAMILY MEDICINE
Payer: COMMERCIAL

## 2020-12-18 PROCEDURE — 77080 DXA BONE DENSITY AXIAL: CPT

## 2021-02-19 ENCOUNTER — HOSPITAL ENCOUNTER (OUTPATIENT)
Dept: LAB | Facility: MEDICAL CENTER | Age: 66
End: 2021-02-19
Attending: FAMILY MEDICINE
Payer: COMMERCIAL

## 2021-02-19 DIAGNOSIS — E78.49 FAMILIAL HYPERLIPIDEMIA: ICD-10-CM

## 2021-02-19 DIAGNOSIS — E03.4 HYPOTHYROIDISM DUE TO ACQUIRED ATROPHY OF THYROID: ICD-10-CM

## 2021-02-19 DIAGNOSIS — E55.9 VITAMIN D INSUFFICIENCY: ICD-10-CM

## 2021-02-19 DIAGNOSIS — E74.39 GLUCOSE INTOLERANCE: ICD-10-CM

## 2021-02-19 LAB
25(OH)D3 SERPL-MCNC: 55 NG/ML (ref 30–100)
ALBUMIN SERPL BCP-MCNC: 4.1 G/DL (ref 3.2–4.9)
ALBUMIN/GLOB SERPL: 1.4 G/DL
ALP SERPL-CCNC: 57 U/L (ref 30–99)
ALT SERPL-CCNC: 31 U/L (ref 2–50)
ANION GAP SERPL CALC-SCNC: 8 MMOL/L (ref 7–16)
AST SERPL-CCNC: 26 U/L (ref 12–45)
BILIRUB SERPL-MCNC: 0.4 MG/DL (ref 0.1–1.5)
BUN SERPL-MCNC: 20 MG/DL (ref 8–22)
CALCIUM SERPL-MCNC: 9.5 MG/DL (ref 8.5–10.5)
CHLORIDE SERPL-SCNC: 102 MMOL/L (ref 96–112)
CHOLEST SERPL-MCNC: 195 MG/DL (ref 100–199)
CO2 SERPL-SCNC: 28 MMOL/L (ref 20–33)
CREAT SERPL-MCNC: 0.8 MG/DL (ref 0.5–1.4)
ERYTHROCYTE [DISTWIDTH] IN BLOOD BY AUTOMATED COUNT: 49.3 FL (ref 35.9–50)
EST. AVERAGE GLUCOSE BLD GHB EST-MCNC: 117 MG/DL
FASTING STATUS PATIENT QL REPORTED: NORMAL
GLOBULIN SER CALC-MCNC: 3 G/DL (ref 1.9–3.5)
GLUCOSE SERPL-MCNC: 94 MG/DL (ref 65–99)
HBA1C MFR BLD: 5.7 % (ref 0–5.6)
HCT VFR BLD AUTO: 43 % (ref 37–47)
HDLC SERPL-MCNC: 76 MG/DL
HGB BLD-MCNC: 14.4 G/DL (ref 12–16)
LDLC SERPL CALC-MCNC: 110 MG/DL
MCH RBC QN AUTO: 31.6 PG (ref 27–33)
MCHC RBC AUTO-ENTMCNC: 33.5 G/DL (ref 33.6–35)
MCV RBC AUTO: 94.5 FL (ref 81.4–97.8)
PLATELET # BLD AUTO: 192 K/UL (ref 164–446)
PMV BLD AUTO: 11.5 FL (ref 9–12.9)
POTASSIUM SERPL-SCNC: 4 MMOL/L (ref 3.6–5.5)
PROT SERPL-MCNC: 7.1 G/DL (ref 6–8.2)
RBC # BLD AUTO: 4.55 M/UL (ref 4.2–5.4)
SODIUM SERPL-SCNC: 138 MMOL/L (ref 135–145)
T3 SERPL-MCNC: 101 NG/DL (ref 60–181)
T4 FREE SERPL-MCNC: 1.16 NG/DL (ref 0.93–1.7)
TRIGL SERPL-MCNC: 46 MG/DL (ref 0–149)
TSH SERPL DL<=0.005 MIU/L-ACNC: 5.1 UIU/ML (ref 0.38–5.33)
WBC # BLD AUTO: 4.8 K/UL (ref 4.8–10.8)

## 2021-02-19 PROCEDURE — 84480 ASSAY TRIIODOTHYRONINE (T3): CPT

## 2021-02-19 PROCEDURE — 84443 ASSAY THYROID STIM HORMONE: CPT

## 2021-02-19 PROCEDURE — 85027 COMPLETE CBC AUTOMATED: CPT

## 2021-02-19 PROCEDURE — 83036 HEMOGLOBIN GLYCOSYLATED A1C: CPT

## 2021-02-19 PROCEDURE — 80061 LIPID PANEL: CPT

## 2021-02-19 PROCEDURE — 36415 COLL VENOUS BLD VENIPUNCTURE: CPT

## 2021-02-19 PROCEDURE — 80053 COMPREHEN METABOLIC PANEL: CPT

## 2021-02-19 PROCEDURE — 82306 VITAMIN D 25 HYDROXY: CPT

## 2021-02-19 PROCEDURE — 84439 ASSAY OF FREE THYROXINE: CPT

## 2021-02-22 ENCOUNTER — TELEPHONE (OUTPATIENT)
Dept: MEDICAL GROUP | Facility: PHYSICIAN GROUP | Age: 66
End: 2021-02-22

## 2021-02-22 NOTE — TELEPHONE ENCOUNTER
Phone Number Called: [phone]    Call outcome: Left detailed message for patient. Informed to call back with any additional questions.    Message: Called to inform patient labs look ok      ----- Message from Faisal Patel M.D. sent at 2/22/2021  8:00 AM PST -----  Labs look ok

## 2021-03-01 ENCOUNTER — HOSPITAL ENCOUNTER (OUTPATIENT)
Facility: MEDICAL CENTER | Age: 66
End: 2021-03-01
Attending: FAMILY MEDICINE
Payer: COMMERCIAL

## 2021-03-01 PROCEDURE — 82274 ASSAY TEST FOR BLOOD FECAL: CPT

## 2021-03-03 DIAGNOSIS — Z12.12 SCREENING FOR COLORECTAL CANCER: ICD-10-CM

## 2021-03-03 DIAGNOSIS — Z12.11 SCREENING FOR COLORECTAL CANCER: ICD-10-CM

## 2021-03-06 LAB — IMM ASSAY OCC BLD FITOB: NEGATIVE

## 2021-04-06 DIAGNOSIS — M81.8 OTHER OSTEOPOROSIS WITHOUT CURRENT PATHOLOGICAL FRACTURE: ICD-10-CM

## 2021-04-06 DIAGNOSIS — E78.49 FAMILIAL HYPERLIPIDEMIA: ICD-10-CM

## 2021-04-06 DIAGNOSIS — E03.4 HYPOTHYROIDISM DUE TO ACQUIRED ATROPHY OF THYROID: ICD-10-CM

## 2021-04-06 DIAGNOSIS — E55.9 VITAMIN D INSUFFICIENCY: ICD-10-CM

## 2021-04-06 DIAGNOSIS — Z76.0 MEDICATION REFILL: ICD-10-CM

## 2021-04-06 RX ORDER — ATORVASTATIN CALCIUM 40 MG/1
TABLET, FILM COATED ORAL
Qty: 90 TABLET | Refills: 3 | Status: SHIPPED | OUTPATIENT
Start: 2021-04-06 | End: 2022-04-11

## 2021-04-06 RX ORDER — ALENDRONATE SODIUM 70 MG/1
TABLET ORAL
Qty: 12 TABLET | Refills: 1 | Status: SHIPPED | OUTPATIENT
Start: 2021-04-06 | End: 2021-09-22

## 2021-04-06 RX ORDER — LEVOTHYROXINE SODIUM 0.05 MG/1
50 TABLET ORAL EVERY MORNING
Qty: 90 TABLET | Refills: 1 | Status: SHIPPED | OUTPATIENT
Start: 2021-04-06 | End: 2021-12-20

## 2021-07-28 ENCOUNTER — HOSPITAL ENCOUNTER (OUTPATIENT)
Dept: RADIOLOGY | Facility: MEDICAL CENTER | Age: 66
End: 2021-07-28
Attending: FAMILY MEDICINE
Payer: COMMERCIAL

## 2021-07-28 DIAGNOSIS — Z12.31 VISIT FOR SCREENING MAMMOGRAM: ICD-10-CM

## 2021-07-28 PROCEDURE — 77063 BREAST TOMOSYNTHESIS BI: CPT

## 2021-09-22 DIAGNOSIS — E55.9 VITAMIN D INSUFFICIENCY: ICD-10-CM

## 2021-09-22 DIAGNOSIS — Z76.0 MEDICATION REFILL: ICD-10-CM

## 2021-09-22 DIAGNOSIS — E78.49 FAMILIAL HYPERLIPIDEMIA: ICD-10-CM

## 2021-09-22 DIAGNOSIS — E03.4 HYPOTHYROIDISM DUE TO ACQUIRED ATROPHY OF THYROID: ICD-10-CM

## 2021-09-22 RX ORDER — ALENDRONATE SODIUM 70 MG/1
TABLET ORAL
Qty: 12 TABLET | Refills: 1 | Status: SHIPPED | OUTPATIENT
Start: 2021-09-22 | End: 2022-02-28

## 2021-12-18 DIAGNOSIS — E78.49 FAMILIAL HYPERLIPIDEMIA: ICD-10-CM

## 2021-12-18 DIAGNOSIS — M81.8 OTHER OSTEOPOROSIS WITHOUT CURRENT PATHOLOGICAL FRACTURE: ICD-10-CM

## 2021-12-20 RX ORDER — LEVOTHYROXINE SODIUM 0.05 MG/1
50 TABLET ORAL EVERY MORNING
Qty: 30 TABLET | Refills: 0 | Status: SHIPPED | OUTPATIENT
Start: 2021-12-20 | End: 2022-01-25

## 2021-12-20 NOTE — TELEPHONE ENCOUNTER
Received request via: Pharmacy    Was the patient seen in the last year in this department? No     Does the patient have an active prescription (recently filled or refills available) for medication(s) requested? No     Message has been sent for pt to schedule an appt for long term refills

## 2022-01-21 ENCOUNTER — HOSPITAL ENCOUNTER (OUTPATIENT)
Dept: LAB | Facility: MEDICAL CENTER | Age: 67
End: 2022-01-21
Attending: FAMILY MEDICINE
Payer: MEDICARE

## 2022-01-21 DIAGNOSIS — M81.8 OTHER OSTEOPOROSIS WITHOUT CURRENT PATHOLOGICAL FRACTURE: ICD-10-CM

## 2022-01-21 DIAGNOSIS — E78.49 FAMILIAL HYPERLIPIDEMIA: ICD-10-CM

## 2022-01-21 LAB
ALBUMIN SERPL BCP-MCNC: 4.3 G/DL (ref 3.2–4.9)
ALBUMIN/GLOB SERPL: 1.6 G/DL
ALP SERPL-CCNC: 72 U/L (ref 30–99)
ALT SERPL-CCNC: 29 U/L (ref 2–50)
ANION GAP SERPL CALC-SCNC: 8 MMOL/L (ref 7–16)
AST SERPL-CCNC: 24 U/L (ref 12–45)
BILIRUB SERPL-MCNC: 0.5 MG/DL (ref 0.1–1.5)
BUN SERPL-MCNC: 14 MG/DL (ref 8–22)
CALCIUM SERPL-MCNC: 9.4 MG/DL (ref 8.5–10.5)
CHLORIDE SERPL-SCNC: 104 MMOL/L (ref 96–112)
CHOLEST SERPL-MCNC: 205 MG/DL (ref 100–199)
CO2 SERPL-SCNC: 26 MMOL/L (ref 20–33)
CREAT SERPL-MCNC: 0.71 MG/DL (ref 0.5–1.4)
FASTING STATUS PATIENT QL REPORTED: NORMAL
GLOBULIN SER CALC-MCNC: 2.7 G/DL (ref 1.9–3.5)
GLUCOSE SERPL-MCNC: 95 MG/DL (ref 65–99)
HDLC SERPL-MCNC: 77 MG/DL
LDLC SERPL CALC-MCNC: 115 MG/DL
POTASSIUM SERPL-SCNC: 4.1 MMOL/L (ref 3.6–5.5)
PROT SERPL-MCNC: 7 G/DL (ref 6–8.2)
SODIUM SERPL-SCNC: 138 MMOL/L (ref 135–145)
TRIGL SERPL-MCNC: 64 MG/DL (ref 0–149)

## 2022-01-21 PROCEDURE — 80061 LIPID PANEL: CPT

## 2022-01-21 PROCEDURE — 80053 COMPREHEN METABOLIC PANEL: CPT

## 2022-01-21 PROCEDURE — 36415 COLL VENOUS BLD VENIPUNCTURE: CPT

## 2022-01-23 DIAGNOSIS — M81.8 OTHER OSTEOPOROSIS WITHOUT CURRENT PATHOLOGICAL FRACTURE: ICD-10-CM

## 2022-01-25 ENCOUNTER — PATIENT MESSAGE (OUTPATIENT)
Dept: MEDICAL GROUP | Facility: PHYSICIAN GROUP | Age: 67
End: 2022-01-25

## 2022-01-25 RX ORDER — LEVOTHYROXINE SODIUM 0.05 MG/1
50 TABLET ORAL EVERY MORNING
Qty: 30 TABLET | Refills: 0 | Status: SHIPPED | OUTPATIENT
Start: 2022-01-25 | End: 2022-02-01

## 2022-02-01 ENCOUNTER — OFFICE VISIT (OUTPATIENT)
Dept: MEDICAL GROUP | Facility: PHYSICIAN GROUP | Age: 67
End: 2022-02-01
Payer: MEDICARE

## 2022-02-01 VITALS
BODY MASS INDEX: 25 KG/M2 | HEART RATE: 72 BPM | DIASTOLIC BLOOD PRESSURE: 62 MMHG | TEMPERATURE: 97.5 F | OXYGEN SATURATION: 95 % | WEIGHT: 174.6 LBS | HEIGHT: 70 IN | SYSTOLIC BLOOD PRESSURE: 110 MMHG | RESPIRATION RATE: 16 BRPM

## 2022-02-01 DIAGNOSIS — Z00.00 MEDICARE ANNUAL WELLNESS VISIT, INITIAL: ICD-10-CM

## 2022-02-01 DIAGNOSIS — E78.49 FAMILIAL HYPERLIPIDEMIA: ICD-10-CM

## 2022-02-01 DIAGNOSIS — E03.4 HYPOTHYROIDISM DUE TO ACQUIRED ATROPHY OF THYROID: ICD-10-CM

## 2022-02-01 PROCEDURE — G0438 PPPS, INITIAL VISIT: HCPCS | Performed by: FAMILY MEDICINE

## 2022-02-01 RX ORDER — LEVOTHYROXINE SODIUM 0.05 MG/1
TABLET ORAL
COMMUNITY
End: 2022-02-17

## 2022-02-01 RX ORDER — ATORVASTATIN CALCIUM 40 MG/1
TABLET, FILM COATED ORAL
COMMUNITY
End: 2022-02-01

## 2022-02-01 RX ORDER — MELOXICAM 7.5 MG/1
TABLET ORAL
COMMUNITY
End: 2022-02-01

## 2022-02-01 RX ORDER — ALENDRONATE SODIUM 70 MG/1
TABLET ORAL
COMMUNITY
End: 2022-02-01

## 2022-02-01 RX ORDER — TRIAMCINOLONE ACETONIDE 1 MG/G
OINTMENT TOPICAL
COMMUNITY
End: 2022-02-01

## 2022-02-01 RX ORDER — HYDROCODONE BITARTRATE AND ACETAMINOPHEN 5; 325 MG/1; MG/1
TABLET ORAL
COMMUNITY
End: 2022-02-01

## 2022-02-01 RX ORDER — AZITHROMYCIN 250 MG/1
TABLET, FILM COATED ORAL
COMMUNITY
End: 2022-02-01

## 2022-02-01 RX ORDER — CYCLOSPORINE 0.5 MG/ML
EMULSION OPHTHALMIC
COMMUNITY
End: 2022-02-01

## 2022-02-01 RX ORDER — PROMETHAZINE HYDROCHLORIDE 12.5 MG/1
TABLET ORAL
COMMUNITY
End: 2022-02-01

## 2022-02-01 RX ORDER — NAPROXEN 500 MG/1
TABLET ORAL
COMMUNITY
End: 2022-02-01

## 2022-02-01 ASSESSMENT — FIBROSIS 4 INDEX: FIB4 SCORE: 1.51

## 2022-02-01 ASSESSMENT — ENCOUNTER SYMPTOMS: GENERAL WELL-BEING: EXCELLENT

## 2022-02-01 ASSESSMENT — PATIENT HEALTH QUESTIONNAIRE - PHQ9: CLINICAL INTERPRETATION OF PHQ2 SCORE: 0

## 2022-02-01 ASSESSMENT — ACTIVITIES OF DAILY LIVING (ADL): BATHING_REQUIRES_ASSISTANCE: 0

## 2022-02-02 ENCOUNTER — PATIENT MESSAGE (OUTPATIENT)
Dept: HEALTH INFORMATION MANAGEMENT | Facility: OTHER | Age: 67
End: 2022-02-02
Payer: MEDICARE

## 2022-02-02 NOTE — PROGRESS NOTES
Chief Complaint   Patient presents with   • Annual Wellness Visit         HPI:  Shellie is a 66 y.o. here for Medicare Annual Wellness Visit        Patient Active Problem List    Diagnosis Date Noted   • Lipoma of neck 09/21/2016   • Posterior neck pain 06/27/2016   • H/O tear of meniscus of knee joint 03/12/2014   • DDD (degenerative disc disease), cervical 03/12/2014   • Menopause 09/14/2013   • CLAIR (obstructive sleep apnea) 09/14/2013   • Soft tissue mass 01/04/2013   • Osteoporosis 06/18/2011   • Preventative health care 05/11/2011   • Familial hyperlipidemia 02/08/2010   • Vitamin D insufficiency 02/08/2010   • Atopic dermatitis 02/08/2010       Current Outpatient Medications   Medication Sig Dispense Refill   • levothyroxine (SYNTHROID) 50 MCG Tab levothyroxine 50 mcg tablet     • alendronate (FOSAMAX) 70 MG Tab TAKE 1 TABLET WEEKLY ON AN EMPTY STOMACH WITH A GLASS OF WATER SIT UP ,NO FOOD OR MEDS FOR 30 MINUTES 12 Tablet 1   • atorvastatin (LIPITOR) 40 MG Tab TAKE 1 TABLET BY MOUTH EVERY NIGHT AT BEDTIME 90 tablet 3   • Multiple Vitamins-Minerals (MULTIVITAMIN PO) Take  by mouth every day.     • Cholecalciferol (VITAMIN D3) 2000 UNIT Cap Take  by mouth every day.     • Calcium Carbonate (CALCIUM 600 PO) Take  by mouth every day.     • ibuprofen (MOTRIN) 200 MG TABS Take 600 mg by mouth every 8 hours as needed. Indications: Mild to Moderate Pain     • azithromycin (ZITHROMAX) 250 MG Tab azithromycin 250 mg tablet (Patient not taking: Reported on 2/1/2022)     • HYDROcodone-acetaminophen (NORCO) 5-325 MG Tab per tablet hydrocodone 5 mg-acetaminophen 325 mg tablet (Patient not taking: Reported on 2/1/2022)     • alendronate (FOSAMAX) 70 MG Tab alendronate 70 mg tablet     • atorvastatin (LIPITOR) 40 MG Tab atorvastatin 40 mg tablet     • meloxicam (MOBIC) 7.5 MG Tab meloxicam 7.5 mg tablet (Patient not taking: Reported on 2/1/2022)     • triamcinolone acetonide (KENALOG) 0.1 % Ointment triamcinolone acetonide 0.1  % topical ointment (Patient not taking: Reported on 2/1/2022)     • levothyroxine (SYNTHROID) 50 MCG Tab TAKE 1 TAB BY MOUTH EVERY MORNING. ON A EMPTY STOMACH (Patient not taking: Reported on 2/1/2022) 30 Tablet 0   • propranolol (INDERAL) 10 MG Tab Take 1 Tab by mouth 2 times daily with meals as needed (palpitations). (Patient not taking: Reported on 11/6/2020) 30 Tab 1   • levothyroxine (SYNTHROID) 50 MCG Tab TAKE 1 TABLET BY MOUTH ONCE DAILY IN THE MORNING ON AN  EMPTY STOMACH (Patient not taking: Reported on 4/7/2020) 90 Tab 3   • meloxicam (MOBIC) 7.5 MG Tab Take 1 Tab by mouth every day. (Patient not taking: Reported on 4/7/2020) 90 Tab 1   • sulfamethoxazole-trimethoprim (BACTRIM DS) 800-160 MG tablet Take 1 Tab by mouth 2 times a day. (Patient not taking: Reported on 4/7/2020) 20 Tab 0   • triamcinolone acetonide (KENALOG) 0.1 % Ointment Thin layer to clean dry skin twice daily as needed for rash. (Patient not taking: Reported on 11/6/2020) 60 g 4     No current facility-administered medications for this visit.        Patient is taking medications as noted in medication list.  Current supplements as per medication list.     Allergies: Patient has no known allergies.    Current social contact/activities:      Is patient current with immunizations? Yes.    She  reports that she has never smoked. She has never used smokeless tobacco. She reports current alcohol use of about 0.5 oz of alcohol per week. She reports that she does not use drugs.  Counseling given: Not Answered        DPA/Advanced directive: Patient does not have an Advanced Directive.  A packet and workshop information was given on Advanced Directives.    ROS:    Gait: Uses no assistive device   Ostomy: No   Other tubes: No   Amputations: No   Chronic oxygen use No   Last eye exam 4 years ago    Wears hearing aids: No   : Denies any urinary leakage during the last 6 months      Screening:        Depression Screening    Little interest or pleasure  in doing things?  0 - not at all  Feeling down, depressed, or hopeless? 0 - not at all  Patient Health Questionnaire Score: 0    If depressive symptoms identified deferred to follow up visit unless specifically addressed in assessment and plan.    Interpretation of PHQ-9 Total Score   Score Severity   1-4 No Depression   5-9 Mild Depression   10-14 Moderate Depression   15-19 Moderately Severe Depression   20-27 Severe Depression    Screening for Cognitive Impairment    Three Minute Recall (captain, garden, picture)  2/3    Amor clock face with all 12 numbers and set the hands to show 5 past 8.  Yes    If cognitive concerns identified, deferred for follow up unless specifically addressed in assessment and plan.    Fall Risk Assessment    Has the patient had two or more falls in the last year or any fall with injury in the last year?  No  If fall risk identified, deferred for follow up unless specifically addressed in assessment and plan.    Safety Assessment    Throw rugs on floor.  Yes  Handrails on all stairs.  No  Good lighting in all hallways.  Yes  Difficulty hearing.  No  Patient counseled about all safety risks that were identified.    Functional Assessment ADLs    Are there any barriers preventing you from cooking for yourself or meeting nutritional needs?  No.    Are there any barriers preventing you from driving safely or obtaining transportation?  No.    Are there any barriers preventing you from using a telephone or calling for help?  No.    Are there any barriers preventing you from shopping?  No.    Are there any barriers preventing you from taking care of your own finances?  No.    Are there any barriers preventing you from managing your medications?  No.    Are there any barriers preventing you from showering, bathing or dressing yourself?  No.    Are you currently engaging in any exercise or physical activity?  Yes.  Yoga 2-3 times a wk, walking dog  What is your perception of your health?   Excellent.    Health Maintenance Summary          Overdue - Annual Wellness Visit (Once) Overdue - never done    No completion history exists for this topic.          Overdue - IMM ZOSTER VACCINES (1 of 2) Overdue - never done    No completion history exists for this topic.          Overdue - IMM INFLUENZA (1) Overdue - never done    No completion history exists for this topic.          Overdue - COVID-19 Vaccine (3 - Booster for Moderna series) Overdue since 10/11/2021    05/11/2021  Imm Admin: Moderna SARS-CoV-2 Vaccine    04/13/2021  Imm Admin: Moderna SARS-CoV-2 Vaccine          Overdue - IMM PNEUMOCOCCAL VACCINE: 65+ Years (1 of 1 - PPSV23) Overdue - never done    No completion history exists for this topic.          COLORECTAL CANCER SCREENING (COLON CANCER SCREENING ANNUAL FIT - Yearly) Due soon on 3/1/2022    03/01/2021  OCCULT BLOOD FECES IMMUNOASSAY (FIT)    05/24/2019  OCCULT BLOOD FECES IMMUNOASSAY (FIT)    12/21/2007  AMB REFERRAL TO GI FOR COLONOSCOPY          PAP SMEAR (Every 3 Years) Tentatively due on 3/11/2022    03/11/2019  Done    03/11/2019  THINPREP PAP W/HPV AND CTNG    03/11/2019  Pathology Gynecology Specimen    09/15/2015  THINPREP PAP WITH HPV    09/14/2015  PATHOLOGY GYN SPECIMEN    Only the first 5 history entries have been loaded, but more history exists.          MAMMOGRAM (Yearly) Tentatively due on 7/28/2022 07/28/2021  MA-SCREENING MAMMO BILAT W/IMPLANTS W/RAYMUNDO W/CAD    07/01/2020  MA-SCREENING MAMMO BILAT W/IMPLANTS W/RAYMUNDO W/CAD    02/20/2019  MA-MAMMO SCREEN BILAT IMPLANTS RAYMUNDO CAD    12/13/2017  MA-SCREEN MAMMO W/IMPLANTS W/CAD-BILAT    12/07/2016  MA-SCREEN MAMMO W/IMPLANTS W/CAD-BILAT    Only the first 5 history entries have been loaded, but more history exists.          IMM DTaP/Tdap/Td Vaccine (3 - Td or Tdap) Next due on 5/11/2027 05/11/2017  Imm Admin: Tdap Vaccine    07/21/2006  Imm Admin: Tdap Vaccine    01/01/2006  Imm Admin: TD Vaccine          IMM HEP B VACCINE  (Series Information) Aged Out    No completion history exists for this topic.          IMM MENINGOCOCCAL VACCINE (MCV4) (Series Information) Aged Out    No completion history exists for this topic.          Discontinued - HEPATITIS C SCREENING  Discontinued    No completion history exists for this topic.                Patient Care Team:  Faisal Patel M.D. as PCP - General (Family Medicine)  Troy Kapoor M.D. (Inactive) (Plastic Surgery)    Social History     Tobacco Use   • Smoking status: Never Smoker   • Smokeless tobacco: Never Used   Vaping Use   • Vaping Use: Never used   Substance Use Topics   • Alcohol use: Yes     Alcohol/week: 0.5 oz     Types: 1 Glasses of wine per week     Comment: 1-2 per week   • Drug use: No     Family History   Problem Relation Age of Onset   • Stroke Mother 75        d 86   • Heart Disease Father         d. 59, rheum heart dz   • Hyperlipidemia Father    • Cancer Sister         Melanoma   • Heart Disease Sister         aneurysm on heart   • Diabetes Neg Hx      She  has a past medical history of Atopic dermatitis (2/8/2010), Baker's cyst of knee (9/1/2011), Breast augmentation (1999), DDD (degenerative disc disease), cervical (3/12/2014), Familial hyperlipidemia (2/8/2010), H/O tear of meniscus of knee joint (3/12/2014), High cholesterol, Medial meniscus tear (9/1/2011), Menopause (9/14/2013), Mononucleosis (19yo), CLAIR (obstructive sleep apnea) (9/14/2013), Osteopenia (6/18/2011), Soft tissue mass (1/4/2013), and Vitamin d deficiency (2/8/2010).   Past Surgical History:   Procedure Laterality Date   • MASS EXCISION ORTHO Bilateral 9/21/2016    Procedure: MASS EXCISION ORTHO - EXCISION POSTERIOR NECK LIPOMA;  Surgeon: Troy Kapoor M.D.;  Location: SURGERY [facility];  Service:    • MENISCECTOMY Left may 2014    Karlos - left knee   • KNEE ARTHROSCOPY  10/6/2011    Performed by ALEXANDRIA BRAR at SURGERY [facility]   • MENISCECTOMY, KNEE, MEDIAL   "10/6/2011    Performed by ALEXANDRIA BRAR at SURGERY [facility] ORS   • COLONOSCOPY  2007   • MAMMOPLASTY AUGMENTATION  1999    saline   • TUBAL COAGULATION LAPAROSCOPIC BILATERAL  1997   • PRIMARY C SECTION  1984   • MD BREAST AUGMENTATION WITH IMPLANT             Exam:     Pulse 72   Temp 36.4 °C (97.5 °F) (Temporal)   Resp 16   Ht 1.778 m (5' 10\")   Wt 79.2 kg (174 lb 9.6 oz)   SpO2 95%  Body mass index is 25.05 kg/m².    Hearing excellent.    Dentition good  Alert, oriented in no acute distress.  Eye contact is good, speech goal directed, affect calm      Assessment and Plan. The following treatment and monitoring plan is recommended:    1. Medicare annual wellness visit, initial  utd on hm    2. Familial hyperlipidemia  Currently treated for HLD, taking meds with no new myalgias or joint pain, watching fats in diet  controlled        3. Hypothyroidism due to acquired atrophy of thyroid  Patient is being treated for hypothyroidism, currently taking meds, no symptoms of fast or slow heartbeat and energy level steady. No new hair loss or skin symptoms. Labs have been checked in past year and are stable on current dose.  controlled        Past Medical History:   Diagnosis Date   • Atopic dermatitis 2/8/2010   • Baker's cyst of knee 9/1/2011   • Breast augmentation 1999    Saline   • DDD (degenerative disc disease), cervical 3/12/2014   • Familial hyperlipidemia 2/8/2010   • H/O tear of meniscus of knee joint 3/12/2014    Right knee. Had surgery 2011. Now left knee pain. Felt great last year, working out. Overdid it working out. Increased pain to point very swollen, hard to bend and straighten left leg starting last week. Has felt like about to give way when walking today. Has MRI scheduled. Feels just like the right knee felt before surgery. No brace. Not helping due to swelling. Taking ibuprofen. 600 mg bid.    • High cholesterol    • Medial meniscus tear 9/1/2011   • Menopause 9/14/2013 2007   • " Mononucleosis 19yo    Mono induced Hepatitis   • CLAIR (obstructive sleep apnea) 9/14/2013    AHI 17  In 2009. Uses nightgard   • Osteopenia 6/18/2011   • Soft tissue mass 1/4/2013   • Vitamin d deficiency 2/8/2010     Past Surgical History:   Procedure Laterality Date   • MASS EXCISION ORTHO Bilateral 9/21/2016    Procedure: MASS EXCISION ORTHO - EXCISION POSTERIOR NECK LIPOMA;  Surgeon: Troy Kapoor M.D.;  Location: SURGERY [facility] ORS;  Service:    • MENISCECTOMY Left may 2014    Karlos - left knee   • KNEE ARTHROSCOPY  10/6/2011    Performed by ALEXANDRIA BRAR at SURGERY [facility]   • MENISCECTOMY, KNEE, MEDIAL  10/6/2011    Performed by ALEXANDRIA BRAR at SURGERY [facility]   • COLONOSCOPY  2007   • MAMMOPLASTY AUGMENTATION  1999    saline   • TUBAL COAGULATION LAPAROSCOPIC BILATERAL  1997   • PRIMARY C SECTION  1984   • AK BREAST AUGMENTATION WITH IMPLANT       Social History     Tobacco Use   • Smoking status: Never Smoker   • Smokeless tobacco: Never Used   Vaping Use   • Vaping Use: Never used   Substance Use Topics   • Alcohol use: Yes     Alcohol/week: 0.5 oz     Types: 1 Glasses of wine per week     Comment: 1-2 per week   • Drug use: No     Family History   Problem Relation Age of Onset   • Stroke Mother 75        d 86   • Heart Disease Father         d. 59, rheum heart dz   • Hyperlipidemia Father    • Cancer Sister         Melanoma   • Heart Disease Sister         aneurysm on heart   • Diabetes Neg Hx          Current Outpatient Medications:   •  levothyroxine (SYNTHROID) 50 MCG Tab, levothyroxine 50 mcg tablet, Disp: , Rfl:   •  alendronate (FOSAMAX) 70 MG Tab, TAKE 1 TABLET WEEKLY ON AN EMPTY STOMACH WITH A GLASS OF WATER SIT UP ,NO FOOD OR MEDS FOR 30 MINUTES, Disp: 12 Tablet, Rfl: 1  •  atorvastatin (LIPITOR) 40 MG Tab, TAKE 1 TABLET BY MOUTH EVERY NIGHT AT BEDTIME, Disp: 90 tablet, Rfl: 3  •  Multiple Vitamins-Minerals (MULTIVITAMIN PO), Take  by mouth every  day., Disp: , Rfl:   •  Cholecalciferol (VITAMIN D3) 2000 UNIT Cap, Take  by mouth every day., Disp: , Rfl:   •  Calcium Carbonate (CALCIUM 600 PO), Take  by mouth every day., Disp: , Rfl:   •  ibuprofen (MOTRIN) 200 MG TABS, Take 600 mg by mouth every 8 hours as needed. Indications: Mild to Moderate Pain, Disp: , Rfl:     Patient was instructed on the use of medications, either prescriptions or OTC and informed on when the appropriate follow up time period should be. In addition, patient was also instructed that should any acute worsening occur that they should notify this clinic asap or call 911.          Services suggested: No services needed at this time  Health Care Screening recommendations as per orders if indicated.  Referrals offered: PT/OT/Nutrition counseling/Behavioral Health/Smoking cessation as per orders if indicated.    Discussion today about general wellness and lifestyle habits:    · Prevent falls and reduce trip hazards; Cautioned about securing or removing rugs.  · Have a working fire alarm and carbon monoxide detector;   · Engage in regular physical activity and social activities       Follow-up: No follow-ups on file.

## 2022-02-17 DIAGNOSIS — E03.9 HYPOTHYROIDISM, UNSPECIFIED TYPE: ICD-10-CM

## 2022-02-17 RX ORDER — LEVOTHYROXINE SODIUM 0.05 MG/1
TABLET ORAL
Qty: 90 TABLET | Refills: 3 | Status: SHIPPED | OUTPATIENT
Start: 2022-02-17 | End: 2023-03-06

## 2022-02-26 DIAGNOSIS — E55.9 VITAMIN D INSUFFICIENCY: ICD-10-CM

## 2022-02-26 DIAGNOSIS — Z76.0 MEDICATION REFILL: ICD-10-CM

## 2022-02-26 DIAGNOSIS — E03.4 HYPOTHYROIDISM DUE TO ACQUIRED ATROPHY OF THYROID: ICD-10-CM

## 2022-02-26 DIAGNOSIS — E78.49 FAMILIAL HYPERLIPIDEMIA: ICD-10-CM

## 2022-02-28 RX ORDER — ALENDRONATE SODIUM 70 MG/1
TABLET ORAL
Qty: 12 TABLET | Refills: 3 | Status: SHIPPED | OUTPATIENT
Start: 2022-02-28 | End: 2023-02-08

## 2022-04-10 DIAGNOSIS — E78.49 FAMILIAL HYPERLIPIDEMIA: ICD-10-CM

## 2022-04-11 RX ORDER — ATORVASTATIN CALCIUM 40 MG/1
TABLET, FILM COATED ORAL
Qty: 90 TABLET | Refills: 3 | Status: SHIPPED | OUTPATIENT
Start: 2022-04-11 | End: 2022-11-14

## 2022-04-26 ENCOUNTER — NON-PROVIDER VISIT (OUTPATIENT)
Dept: URGENT CARE | Facility: CLINIC | Age: 67
End: 2022-04-26

## 2022-04-26 DIAGNOSIS — Z02.89 ENCOUNTER FOR OCCUPATIONAL HEALTH ASSESSMENT: ICD-10-CM

## 2022-04-26 LAB
AMP AMPHETAMINE: NORMAL
BAR BARBITURATES: NORMAL
BZO BENZODIAZEPINES: NORMAL
COC COCAINE: NORMAL
INT CON NEG: NORMAL
INT CON POS: NORMAL
MDMA ECSTASY: NORMAL
MET METHAMPHETAMINES: NORMAL
MTD METHADONE: NORMAL
OPI OPIATES: NORMAL
OXY OXYCODONE: NORMAL
PCP PHENCYCLIDINE: NORMAL
POC URINE DRUG SCREEN OCDRS: NEGATIVE
THC: NORMAL

## 2022-04-26 PROCEDURE — 80305 DRUG TEST PRSMV DIR OPT OBS: CPT | Performed by: PHYSICIAN ASSISTANT

## 2022-11-14 ENCOUNTER — HOSPITAL ENCOUNTER (OUTPATIENT)
Dept: RADIOLOGY | Facility: MEDICAL CENTER | Age: 67
End: 2022-11-14
Attending: FAMILY MEDICINE
Payer: MEDICARE

## 2022-11-14 DIAGNOSIS — E78.49 FAMILIAL HYPERLIPIDEMIA: ICD-10-CM

## 2022-11-14 DIAGNOSIS — Z12.31 VISIT FOR SCREENING MAMMOGRAM: ICD-10-CM

## 2022-11-14 PROCEDURE — 77063 BREAST TOMOSYNTHESIS BI: CPT

## 2022-11-14 RX ORDER — ATORVASTATIN CALCIUM 40 MG/1
TABLET, FILM COATED ORAL
Qty: 100 TABLET | Refills: 3 | Status: SHIPPED | OUTPATIENT
Start: 2022-11-14 | End: 2024-02-20

## 2022-11-14 NOTE — TELEPHONE ENCOUNTER
Received request via: Pharmacy    Was the patient seen in the last year in this department? Yes    Does the patient have an active prescription (recently filled or refills available) for medication(s) requested? No    Does the patient have snf Plus and need 100 day supply (blood pressure, diabetes and cholesterol meds only)? Yes, quantity updated to 100 days

## 2023-01-04 NOTE — TELEPHONE ENCOUNTER
Caller states that her daughter in law, with whom she resides with, has tested positive for covid and caller has questions about how to keep herself safe in the home. Caller advised using nursing knowledge, to keep her distance from affected family members, wear mask and hand washing regularly. Caller has no further questions at this time.  Pt advised per protocol and verbalized understanding.     Reason for Disposition   Health Information question, no triage required and triager able to answer question    Additional Information   Negative: Requesting regular office appointment   Negative: [1] Caller requesting NON-URGENT health information AND [2] PCP's office is the best resource    Protocols used: Information Only Call-A-AH     Spoke with patient and let them know Rukhsana Francis M.D.'s message.

## 2023-02-08 DIAGNOSIS — E03.4 HYPOTHYROIDISM DUE TO ACQUIRED ATROPHY OF THYROID: ICD-10-CM

## 2023-02-08 DIAGNOSIS — E78.49 FAMILIAL HYPERLIPIDEMIA: ICD-10-CM

## 2023-02-08 DIAGNOSIS — Z76.0 MEDICATION REFILL: ICD-10-CM

## 2023-02-08 DIAGNOSIS — E55.9 VITAMIN D INSUFFICIENCY: ICD-10-CM

## 2023-02-08 RX ORDER — ALENDRONATE SODIUM 70 MG/1
TABLET ORAL
Qty: 12 TABLET | Refills: 3 | Status: SHIPPED | OUTPATIENT
Start: 2023-02-08 | End: 2024-01-02

## 2023-03-04 DIAGNOSIS — E03.9 HYPOTHYROIDISM, UNSPECIFIED TYPE: ICD-10-CM

## 2023-03-06 RX ORDER — LEVOTHYROXINE SODIUM 0.05 MG/1
50 TABLET ORAL
Qty: 90 TABLET | Refills: 3 | Status: SHIPPED | OUTPATIENT
Start: 2023-03-06 | End: 2024-03-06

## 2023-03-31 ENCOUNTER — APPOINTMENT (RX ONLY)
Dept: URBAN - METROPOLITAN AREA CLINIC 31 | Facility: CLINIC | Age: 68
Setting detail: DERMATOLOGY
End: 2023-03-31

## 2023-03-31 DIAGNOSIS — L72.8 OTHER FOLLICULAR CYSTS OF THE SKIN AND SUBCUTANEOUS TISSUE: ICD-10-CM

## 2023-03-31 PROCEDURE — 99202 OFFICE O/P NEW SF 15 MIN: CPT

## 2023-03-31 PROCEDURE — ? COUNSELING

## 2023-03-31 ASSESSMENT — LOCATION SIMPLE DESCRIPTION DERM: LOCATION SIMPLE: LEFT CHEEK

## 2023-03-31 ASSESSMENT — LOCATION ZONE DERM: LOCATION ZONE: FACE

## 2023-03-31 ASSESSMENT — LOCATION DETAILED DESCRIPTION DERM: LOCATION DETAILED: LEFT INFERIOR CENTRAL MALAR CHEEK

## 2023-03-31 NOTE — HPI: SKIN LESION
Is This A New Presentation, Or A Follow-Up?: Growth
What Type Of Note Output Would You Prefer (Optional)?: Standard Output
Which Family Member (Optional)?: Sister

## 2023-04-21 ENCOUNTER — APPOINTMENT (RX ONLY)
Dept: URBAN - METROPOLITAN AREA CLINIC 31 | Facility: CLINIC | Age: 68
Setting detail: DERMATOLOGY
End: 2023-04-21

## 2023-04-21 DIAGNOSIS — L81.4 OTHER MELANIN HYPERPIGMENTATION: ICD-10-CM

## 2023-04-21 DIAGNOSIS — D18.0 HEMANGIOMA: ICD-10-CM

## 2023-04-21 DIAGNOSIS — Z71.89 OTHER SPECIFIED COUNSELING: ICD-10-CM

## 2023-04-21 DIAGNOSIS — D22 MELANOCYTIC NEVI: ICD-10-CM

## 2023-04-21 DIAGNOSIS — Z80.8 FAMILY HISTORY OF MALIGNANT NEOPLASM OF OTHER ORGANS OR SYSTEMS: ICD-10-CM

## 2023-04-21 DIAGNOSIS — L82.1 OTHER SEBORRHEIC KERATOSIS: ICD-10-CM

## 2023-04-21 PROBLEM — D18.01 HEMANGIOMA OF SKIN AND SUBCUTANEOUS TISSUE: Status: ACTIVE | Noted: 2023-04-21

## 2023-04-21 PROBLEM — D22.5 MELANOCYTIC NEVI OF TRUNK: Status: ACTIVE | Noted: 2023-04-21

## 2023-04-21 PROCEDURE — ? COUNSELING

## 2023-04-21 PROCEDURE — 99213 OFFICE O/P EST LOW 20 MIN: CPT

## 2023-04-21 ASSESSMENT — LOCATION DETAILED DESCRIPTION DERM
LOCATION DETAILED: RIGHT INFERIOR UPPER BACK
LOCATION DETAILED: RIGHT SUPERIOR LATERAL MIDBACK
LOCATION DETAILED: RIGHT SUPERIOR UPPER BACK
LOCATION DETAILED: RIGHT CENTRAL FRONTAL SCALP

## 2023-04-21 ASSESSMENT — LOCATION ZONE DERM
LOCATION ZONE: SCALP
LOCATION ZONE: TRUNK

## 2023-04-21 ASSESSMENT — LOCATION SIMPLE DESCRIPTION DERM
LOCATION SIMPLE: RIGHT LOWER BACK
LOCATION SIMPLE: RIGHT SCALP
LOCATION SIMPLE: RIGHT UPPER BACK

## 2023-06-29 NOTE — TELEPHONE ENCOUNTER
Was the patient seen in the last year in this department? Yes    Does patient have an active prescription for medications requested? Yes    Received Request Via: Pharmacy     Pt forgot to mention this to you about getting refilled    
Offered and patient declined

## 2023-12-31 DIAGNOSIS — E03.4 HYPOTHYROIDISM DUE TO ACQUIRED ATROPHY OF THYROID: ICD-10-CM

## 2023-12-31 DIAGNOSIS — E55.9 VITAMIN D INSUFFICIENCY: ICD-10-CM

## 2023-12-31 DIAGNOSIS — E78.49 FAMILIAL HYPERLIPIDEMIA: ICD-10-CM

## 2023-12-31 DIAGNOSIS — Z76.0 MEDICATION REFILL: ICD-10-CM

## 2024-01-02 RX ORDER — ALENDRONATE SODIUM 70 MG/1
TABLET ORAL
Qty: 12 TABLET | Refills: 3 | Status: SHIPPED | OUTPATIENT
Start: 2024-01-02

## 2024-02-20 DIAGNOSIS — E78.49 FAMILIAL HYPERLIPIDEMIA: ICD-10-CM

## 2024-02-20 RX ORDER — ATORVASTATIN CALCIUM 40 MG/1
TABLET, FILM COATED ORAL
Qty: 100 TABLET | Refills: 3 | Status: SHIPPED | OUTPATIENT
Start: 2024-02-20

## 2024-02-20 NOTE — TELEPHONE ENCOUNTER
Received request via: Patient    Was the patient seen in the last year in this department? No    Does the patient have an active prescription (recently filled or refills available) for medication(s) requested? No    [facility]    Does the patient have group home Plus and need 100 day supply (blood pressure, diabetes and cholesterol meds only)? Patient does not have SCP

## 2024-03-02 DIAGNOSIS — E03.9 HYPOTHYROIDISM, UNSPECIFIED TYPE: ICD-10-CM

## 2024-03-05 NOTE — TELEPHONE ENCOUNTER
Received request via: Patient    Was the patient seen in the last year in this department? No    Does the patient have an active prescription (recently filled or refills available) for medication(s) requested? No    [facility]    Does the patient have [facility] Plus and need 100 day supply (blood pressure, diabetes and cholesterol meds only)? Patient does not have SCP

## 2024-03-06 RX ORDER — LEVOTHYROXINE SODIUM 0.05 MG/1
50 TABLET ORAL
Qty: 90 TABLET | Refills: 0 | Status: SHIPPED | OUTPATIENT
Start: 2024-03-06 | End: 2024-03-25

## 2024-03-25 DIAGNOSIS — E03.9 HYPOTHYROIDISM, UNSPECIFIED TYPE: ICD-10-CM

## 2024-03-25 RX ORDER — LEVOTHYROXINE SODIUM 0.05 MG/1
50 TABLET ORAL
Qty: 90 TABLET | Refills: 0 | Status: SHIPPED | OUTPATIENT
Start: 2024-03-25

## 2024-03-25 NOTE — TELEPHONE ENCOUNTER
Received request via: Pharmacy    Was the patient seen in the last year in this department? Yes    Does the patient have an active prescription (recently filled or refills available) for medication(s) requested? No    Pharmacy Name: Pharmacy:   [facility] #[id number] - [place], [place] - [address]     Does the patient have care home Plus and need 100 day supply (blood pressure, diabetes and cholesterol meds only)? Patient does not have SCP

## 2024-04-23 ENCOUNTER — APPOINTMENT (RX ONLY)
Dept: URBAN - METROPOLITAN AREA CLINIC 31 | Facility: CLINIC | Age: 69
Setting detail: DERMATOLOGY
End: 2024-04-23

## 2024-04-23 DIAGNOSIS — L81.4 OTHER MELANIN HYPERPIGMENTATION: ICD-10-CM

## 2024-04-23 DIAGNOSIS — L82.1 OTHER SEBORRHEIC KERATOSIS: ICD-10-CM

## 2024-04-23 DIAGNOSIS — D22 MELANOCYTIC NEVI: ICD-10-CM

## 2024-04-23 DIAGNOSIS — D18.0 HEMANGIOMA: ICD-10-CM

## 2024-04-23 DIAGNOSIS — Z80.8 FAMILY HISTORY OF MALIGNANT NEOPLASM OF OTHER ORGANS OR SYSTEMS: ICD-10-CM

## 2024-04-23 DIAGNOSIS — Z71.89 OTHER SPECIFIED COUNSELING: ICD-10-CM

## 2024-04-23 PROBLEM — D18.01 HEMANGIOMA OF SKIN AND SUBCUTANEOUS TISSUE: Status: ACTIVE | Noted: 2024-04-23

## 2024-04-23 PROBLEM — D22.5 MELANOCYTIC NEVI OF TRUNK: Status: ACTIVE | Noted: 2024-04-23

## 2024-04-23 PROCEDURE — 99213 OFFICE O/P EST LOW 20 MIN: CPT

## 2024-04-23 PROCEDURE — ? COUNSELING

## 2024-04-23 ASSESSMENT — LOCATION SIMPLE DESCRIPTION DERM
LOCATION SIMPLE: RIGHT LOWER BACK
LOCATION SIMPLE: RIGHT UPPER BACK

## 2024-04-23 ASSESSMENT — LOCATION DETAILED DESCRIPTION DERM
LOCATION DETAILED: RIGHT SUPERIOR LATERAL MIDBACK
LOCATION DETAILED: RIGHT INFERIOR UPPER BACK
LOCATION DETAILED: RIGHT MID-UPPER BACK
LOCATION DETAILED: RIGHT SUPERIOR UPPER BACK

## 2024-04-23 ASSESSMENT — LOCATION ZONE DERM: LOCATION ZONE: TRUNK

## 2024-04-23 NOTE — PROCEDURE: COUNSELING
Detail Level: Generalized
Sunscreen Recommendations: Sun screen (SPF 30 or greater) should be applied during peak UV exposure (between 10am and 2pm) and reapplied after exercise or swimming.\\nRecommended La Roche- Posay Anthelios with SPF 60 or Cuco Tone Water Babies with SPF 30 and above.

## 2024-05-06 ENCOUNTER — APPOINTMENT (RX ONLY)
Dept: URBAN - METROPOLITAN AREA CLINIC 31 | Facility: CLINIC | Age: 69
Setting detail: DERMATOLOGY
End: 2024-05-06

## 2024-05-06 DIAGNOSIS — L82.1 OTHER SEBORRHEIC KERATOSIS: ICD-10-CM

## 2024-05-06 DIAGNOSIS — L91.8 OTHER HYPERTROPHIC DISORDERS OF THE SKIN: ICD-10-CM

## 2024-05-06 PROCEDURE — ? COUNSELING

## 2024-05-06 PROCEDURE — ? BENIGN DESTRUCTION COSMETIC

## 2024-05-06 ASSESSMENT — LOCATION DETAILED DESCRIPTION DERM: LOCATION DETAILED: LEFT LATERAL BREAST 3-4:00 REGION

## 2024-05-06 ASSESSMENT — LOCATION ZONE DERM: LOCATION ZONE: TRUNK

## 2024-05-06 ASSESSMENT — LOCATION SIMPLE DESCRIPTION DERM: LOCATION SIMPLE: LEFT BREAST

## 2024-05-06 NOTE — PROCEDURE: BENIGN DESTRUCTION COSMETIC
Detail Level: Detailed
Anesthesia Volume In Cc: 0.5
Price (Use Numbers Only, No Special Characters Or $): 160
Post-Care Instructions: I reviewed with the patient in detail post-care instructions. Patient is to wear sunprotection, and avoid picking at any of the treated lesions. Pt may apply Vaseline to crusted or scabbing areas.
Consent: The patient's consent was obtained including but not limited to risks of crusting, scabbing, blistering, scarring, darker or lighter pigmentary change, recurrence, incomplete removal and infection.
Price (Use Numbers Only, No Special Characters Or $): 0

## 2024-09-10 PROBLEM — S82.842A ANKLE FRACTURE, BIMALLEOLAR, CLOSED, LEFT, INITIAL ENCOUNTER: Status: ACTIVE | Noted: 2024-09-10

## 2025-02-27 DIAGNOSIS — Z76.0 MEDICATION REFILL: ICD-10-CM

## 2025-02-27 DIAGNOSIS — E03.4 HYPOTHYROIDISM DUE TO ACQUIRED ATROPHY OF THYROID: ICD-10-CM

## 2025-02-27 DIAGNOSIS — E55.9 VITAMIN D INSUFFICIENCY: ICD-10-CM

## 2025-02-27 DIAGNOSIS — E78.49 FAMILIAL HYPERLIPIDEMIA: ICD-10-CM

## 2025-02-27 RX ORDER — ALENDRONATE SODIUM 70 MG/1
TABLET ORAL
Qty: 12 TABLET | Refills: 3 | Status: SHIPPED | OUTPATIENT
Start: 2025-02-27